# Patient Record
Sex: MALE | Race: WHITE | Employment: FULL TIME | URBAN - METROPOLITAN AREA
[De-identification: names, ages, dates, MRNs, and addresses within clinical notes are randomized per-mention and may not be internally consistent; named-entity substitution may affect disease eponyms.]

---

## 2025-04-18 ENCOUNTER — APPOINTMENT (OUTPATIENT)
Dept: GENERAL RADIOLOGY | Age: 45
DRG: 308 | End: 2025-04-18
Payer: COMMERCIAL

## 2025-04-18 ENCOUNTER — HOSPITAL ENCOUNTER (INPATIENT)
Age: 45
LOS: 7 days | Discharge: ANOTHER HOSPITAL | DRG: 308 | End: 2025-04-25
Attending: EMERGENCY MEDICINE | Admitting: INTERNAL MEDICINE
Payer: COMMERCIAL

## 2025-04-18 ENCOUNTER — APPOINTMENT (OUTPATIENT)
Dept: CT IMAGING | Age: 45
DRG: 308 | End: 2025-04-18
Payer: COMMERCIAL

## 2025-04-18 DIAGNOSIS — I48.91 NEW ONSET ATRIAL FIBRILLATION (HCC): Primary | ICD-10-CM

## 2025-04-18 DIAGNOSIS — I48.0 PAROXYSMAL ATRIAL FIBRILLATION (HCC): ICD-10-CM

## 2025-04-18 DIAGNOSIS — I50.33 ACUTE ON CHRONIC HEART FAILURE WITH PRESERVED EJECTION FRACTION (HCC): ICD-10-CM

## 2025-04-18 DIAGNOSIS — I48.91 ATRIAL FIBRILLATION WITH RVR (HCC): ICD-10-CM

## 2025-04-18 LAB
ALBUMIN SERPL-MCNC: 3.7 G/DL (ref 3.5–5.2)
ALP SERPL-CCNC: 63 U/L (ref 40–129)
ALT SERPL-CCNC: 88 U/L (ref 0–40)
ANION GAP SERPL CALCULATED.3IONS-SCNC: 15 MMOL/L (ref 7–16)
AST SERPL-CCNC: 73 U/L (ref 0–39)
B PARAP IS1001 DNA NPH QL NAA+NON-PROBE: NOT DETECTED
B PERT DNA SPEC QL NAA+PROBE: NOT DETECTED
BASOPHILS # BLD: 0.04 K/UL (ref 0–0.2)
BASOPHILS NFR BLD: 0 % (ref 0–2)
BILIRUB SERPL-MCNC: 2.3 MG/DL (ref 0–1.2)
BNP SERPL-MCNC: 1976 PG/ML (ref 0–125)
BUN SERPL-MCNC: 31 MG/DL (ref 6–20)
C PNEUM DNA NPH QL NAA+NON-PROBE: NOT DETECTED
CALCIUM SERPL-MCNC: 8.8 MG/DL (ref 8.6–10.2)
CHLORIDE SERPL-SCNC: 103 MMOL/L (ref 98–107)
CO2 SERPL-SCNC: 20 MMOL/L (ref 22–29)
CREAT SERPL-MCNC: 1.8 MG/DL (ref 0.7–1.2)
D-DIMER QUANTITATIVE: 576 NG/ML DDU (ref 0–230)
EKG ATRIAL RATE: 202 BPM
EKG Q-T INTERVAL: 248 MS
EKG QRS DURATION: 84 MS
EKG QTC CALCULATION (BAZETT): 444 MS
EKG R AXIS: 84 DEGREES
EKG T AXIS: -172 DEGREES
EKG VENTRICULAR RATE: 193 BPM
EOSINOPHIL # BLD: 0.01 K/UL (ref 0.05–0.5)
EOSINOPHILS RELATIVE PERCENT: 0 % (ref 0–6)
ERYTHROCYTE [DISTWIDTH] IN BLOOD BY AUTOMATED COUNT: 14.1 % (ref 11.5–15)
FLUAV RNA NPH QL NAA+NON-PROBE: NOT DETECTED
FLUBV RNA NPH QL NAA+NON-PROBE: NOT DETECTED
GFR, ESTIMATED: 46 ML/MIN/1.73M2
GLUCOSE SERPL-MCNC: 102 MG/DL (ref 74–99)
HADV DNA NPH QL NAA+NON-PROBE: NOT DETECTED
HCOV 229E RNA NPH QL NAA+NON-PROBE: NOT DETECTED
HCOV HKU1 RNA NPH QL NAA+NON-PROBE: NOT DETECTED
HCOV NL63 RNA NPH QL NAA+NON-PROBE: NOT DETECTED
HCOV OC43 RNA NPH QL NAA+NON-PROBE: NOT DETECTED
HCT VFR BLD AUTO: 46 % (ref 37–54)
HGB BLD-MCNC: 15.1 G/DL (ref 12.5–16.5)
HMPV RNA NPH QL NAA+NON-PROBE: NOT DETECTED
HPIV1 RNA NPH QL NAA+NON-PROBE: NOT DETECTED
HPIV2 RNA NPH QL NAA+NON-PROBE: NOT DETECTED
HPIV3 RNA NPH QL NAA+NON-PROBE: NOT DETECTED
HPIV4 RNA NPH QL NAA+NON-PROBE: NOT DETECTED
IMM GRANULOCYTES # BLD AUTO: 0.13 K/UL (ref 0–0.58)
IMM GRANULOCYTES NFR BLD: 1 % (ref 0–5)
INFLUENZA A BY PCR: NOT DETECTED
INFLUENZA B BY PCR: NOT DETECTED
LYMPHOCYTES NFR BLD: 2.28 K/UL (ref 1.5–4)
LYMPHOCYTES RELATIVE PERCENT: 17 % (ref 20–42)
M PNEUMO DNA NPH QL NAA+NON-PROBE: NOT DETECTED
MAGNESIUM SERPL-MCNC: 1.8 MG/DL (ref 1.6–2.6)
MCH RBC QN AUTO: 29.1 PG (ref 26–35)
MCHC RBC AUTO-ENTMCNC: 32.8 G/DL (ref 32–34.5)
MCV RBC AUTO: 88.6 FL (ref 80–99.9)
MONOCYTES NFR BLD: 1.48 K/UL (ref 0.1–0.95)
MONOCYTES NFR BLD: 11 % (ref 2–12)
NEUTROPHILS NFR BLD: 71 % (ref 43–80)
NEUTS SEG NFR BLD: 9.79 K/UL (ref 1.8–7.3)
PLATELET # BLD AUTO: 258 K/UL (ref 130–450)
PMV BLD AUTO: 10.4 FL (ref 7–12)
POTASSIUM SERPL-SCNC: 3.9 MMOL/L (ref 3.5–5)
PROCALCITONIN SERPL-MCNC: 0.07 NG/ML (ref 0–0.08)
PROT SERPL-MCNC: 6.2 G/DL (ref 6.4–8.3)
RBC # BLD AUTO: 5.19 M/UL (ref 3.8–5.8)
RSV RNA NPH QL NAA+NON-PROBE: NOT DETECTED
RV+EV RNA NPH QL NAA+NON-PROBE: NOT DETECTED
SARS-COV-2 RNA NPH QL NAA+NON-PROBE: NOT DETECTED
SODIUM SERPL-SCNC: 138 MMOL/L (ref 132–146)
SPECIMEN DESCRIPTION: NORMAL
T4 FREE SERPL-MCNC: 1 NG/DL (ref 0.9–1.7)
TROPONIN I SERPL HS-MCNC: 17 NG/L (ref 0–22)
TROPONIN I SERPL HS-MCNC: 24 NG/L (ref 0–22)
TSH SERPL DL<=0.05 MIU/L-ACNC: 4.38 UIU/ML (ref 0.27–4.2)
WBC OTHER # BLD: 13.7 K/UL (ref 4.5–11.5)

## 2025-04-18 PROCEDURE — 2500000003 HC RX 250 WO HCPCS

## 2025-04-18 PROCEDURE — 2580000003 HC RX 258

## 2025-04-18 PROCEDURE — 96372 THER/PROPH/DIAG INJ SC/IM: CPT

## 2025-04-18 PROCEDURE — 96375 TX/PRO/DX INJ NEW DRUG ADDON: CPT

## 2025-04-18 PROCEDURE — B24BZZ4 ULTRASONOGRAPHY OF HEART WITH AORTA, TRANSESOPHAGEAL: ICD-10-PCS | Performed by: STUDENT IN AN ORGANIZED HEALTH CARE EDUCATION/TRAINING PROGRAM

## 2025-04-18 PROCEDURE — 84439 ASSAY OF FREE THYROXINE: CPT

## 2025-04-18 PROCEDURE — 84484 ASSAY OF TROPONIN QUANT: CPT

## 2025-04-18 PROCEDURE — 84145 PROCALCITONIN (PCT): CPT

## 2025-04-18 PROCEDURE — 80053 COMPREHEN METABOLIC PANEL: CPT

## 2025-04-18 PROCEDURE — 6360000002 HC RX W HCPCS

## 2025-04-18 PROCEDURE — 2060000000 HC ICU INTERMEDIATE R&B

## 2025-04-18 PROCEDURE — 2580000003 HC RX 258: Performed by: EMERGENCY MEDICINE

## 2025-04-18 PROCEDURE — 6360000004 HC RX CONTRAST MEDICATION: Performed by: RADIOLOGY

## 2025-04-18 PROCEDURE — 96366 THER/PROPH/DIAG IV INF ADDON: CPT

## 2025-04-18 PROCEDURE — 2580000003 HC RX 258: Performed by: INTERNAL MEDICINE

## 2025-04-18 PROCEDURE — 87040 BLOOD CULTURE FOR BACTERIA: CPT

## 2025-04-18 PROCEDURE — 71275 CT ANGIOGRAPHY CHEST: CPT

## 2025-04-18 PROCEDURE — 6370000000 HC RX 637 (ALT 250 FOR IP): Performed by: INTERNAL MEDICINE

## 2025-04-18 PROCEDURE — 5A2204Z RESTORATION OF CARDIAC RHYTHM, SINGLE: ICD-10-PCS | Performed by: STUDENT IN AN ORGANIZED HEALTH CARE EDUCATION/TRAINING PROGRAM

## 2025-04-18 PROCEDURE — 0202U NFCT DS 22 TRGT SARS-COV-2: CPT

## 2025-04-18 PROCEDURE — 85379 FIBRIN DEGRADATION QUANT: CPT

## 2025-04-18 PROCEDURE — 83880 ASSAY OF NATRIURETIC PEPTIDE: CPT

## 2025-04-18 PROCEDURE — 87502 INFLUENZA DNA AMP PROBE: CPT

## 2025-04-18 PROCEDURE — 99223 1ST HOSP IP/OBS HIGH 75: CPT | Performed by: INTERNAL MEDICINE

## 2025-04-18 PROCEDURE — 96365 THER/PROPH/DIAG IV INF INIT: CPT

## 2025-04-18 PROCEDURE — 71045 X-RAY EXAM CHEST 1 VIEW: CPT

## 2025-04-18 PROCEDURE — 83735 ASSAY OF MAGNESIUM: CPT

## 2025-04-18 PROCEDURE — 93005 ELECTROCARDIOGRAM TRACING: CPT

## 2025-04-18 PROCEDURE — 99285 EMERGENCY DEPT VISIT HI MDM: CPT

## 2025-04-18 PROCEDURE — 84443 ASSAY THYROID STIM HORMONE: CPT

## 2025-04-18 PROCEDURE — 93010 ELECTROCARDIOGRAM REPORT: CPT | Performed by: INTERNAL MEDICINE

## 2025-04-18 PROCEDURE — 85025 COMPLETE CBC W/AUTO DIFF WBC: CPT

## 2025-04-18 RX ORDER — ALBUTEROL SULFATE 0.83 MG/ML
2.5 SOLUTION RESPIRATORY (INHALATION) EVERY 4 HOURS PRN
Status: DISCONTINUED | OUTPATIENT
Start: 2025-04-18 | End: 2025-04-25 | Stop reason: HOSPADM

## 2025-04-18 RX ORDER — 0.9 % SODIUM CHLORIDE 0.9 %
1000 INTRAVENOUS SOLUTION INTRAVENOUS ONCE
Status: COMPLETED | OUTPATIENT
Start: 2025-04-18 | End: 2025-04-18

## 2025-04-18 RX ORDER — GUAIFENESIN/DEXTROMETHORPHAN 100-10MG/5
5 SYRUP ORAL EVERY 4 HOURS PRN
Status: DISCONTINUED | OUTPATIENT
Start: 2025-04-18 | End: 2025-04-25 | Stop reason: HOSPADM

## 2025-04-18 RX ORDER — POLYETHYLENE GLYCOL 3350 17 G/17G
17 POWDER, FOR SOLUTION ORAL DAILY PRN
Status: DISCONTINUED | OUTPATIENT
Start: 2025-04-18 | End: 2025-04-25 | Stop reason: HOSPADM

## 2025-04-18 RX ORDER — SERTRALINE HYDROCHLORIDE 100 MG/1
100 TABLET, FILM COATED ORAL DAILY
Status: DISCONTINUED | OUTPATIENT
Start: 2025-04-18 | End: 2025-04-25 | Stop reason: HOSPADM

## 2025-04-18 RX ORDER — DOXYCYCLINE 100 MG/1
100 CAPSULE ORAL EVERY 12 HOURS SCHEDULED
Status: COMPLETED | OUTPATIENT
Start: 2025-04-18 | End: 2025-04-23

## 2025-04-18 RX ORDER — DILTIAZEM HYDROCHLORIDE 5 MG/ML
10 INJECTION INTRAVENOUS ONCE
Status: COMPLETED | OUTPATIENT
Start: 2025-04-18 | End: 2025-04-18

## 2025-04-18 RX ORDER — PREDNISONE 20 MG/1
TABLET ORAL
Status: ON HOLD | COMMUNITY
Start: 2025-04-14

## 2025-04-18 RX ORDER — LEVOTHYROXINE SODIUM 50 UG/1
50 TABLET ORAL DAILY
Status: DISCONTINUED | OUTPATIENT
Start: 2025-04-18 | End: 2025-04-25 | Stop reason: HOSPADM

## 2025-04-18 RX ORDER — POTASSIUM CHLORIDE 7.45 MG/ML
10 INJECTION INTRAVENOUS PRN
Status: DISCONTINUED | OUTPATIENT
Start: 2025-04-18 | End: 2025-04-25 | Stop reason: HOSPADM

## 2025-04-18 RX ORDER — SODIUM CHLORIDE 9 MG/ML
INJECTION, SOLUTION INTRAVENOUS PRN
Status: DISCONTINUED | OUTPATIENT
Start: 2025-04-18 | End: 2025-04-25 | Stop reason: HOSPADM

## 2025-04-18 RX ORDER — DEXTROAMPHETAMINE SACCHARATE, AMPHETAMINE ASPARTATE MONOHYDRATE, DEXTROAMPHETAMINE SULFATE AND AMPHETAMINE SULFATE 3.75; 3.75; 3.75; 3.75 MG/1; MG/1; MG/1; MG/1
15 CAPSULE, EXTENDED RELEASE ORAL EVERY MORNING
Status: ON HOLD | COMMUNITY
Start: 2025-04-01

## 2025-04-18 RX ORDER — SODIUM CHLORIDE 0.9 % (FLUSH) 0.9 %
5-40 SYRINGE (ML) INJECTION PRN
Status: DISCONTINUED | OUTPATIENT
Start: 2025-04-18 | End: 2025-04-25 | Stop reason: HOSPADM

## 2025-04-18 RX ORDER — SODIUM CHLORIDE 9 MG/ML
INJECTION, SOLUTION INTRAVENOUS CONTINUOUS
Status: DISCONTINUED | OUTPATIENT
Start: 2025-04-18 | End: 2025-04-19

## 2025-04-18 RX ORDER — IOPAMIDOL 755 MG/ML
75 INJECTION, SOLUTION INTRAVASCULAR
Status: COMPLETED | OUTPATIENT
Start: 2025-04-18 | End: 2025-04-18

## 2025-04-18 RX ORDER — ONDANSETRON 4 MG/1
4 TABLET, ORALLY DISINTEGRATING ORAL EVERY 8 HOURS PRN
Status: DISCONTINUED | OUTPATIENT
Start: 2025-04-18 | End: 2025-04-25 | Stop reason: HOSPADM

## 2025-04-18 RX ORDER — POTASSIUM CHLORIDE 1500 MG/1
40 TABLET, EXTENDED RELEASE ORAL PRN
Status: DISCONTINUED | OUTPATIENT
Start: 2025-04-18 | End: 2025-04-25 | Stop reason: HOSPADM

## 2025-04-18 RX ORDER — MAGNESIUM SULFATE IN WATER 40 MG/ML
2000 INJECTION, SOLUTION INTRAVENOUS PRN
Status: DISCONTINUED | OUTPATIENT
Start: 2025-04-18 | End: 2025-04-25 | Stop reason: HOSPADM

## 2025-04-18 RX ORDER — ACETAMINOPHEN 650 MG/1
650 SUPPOSITORY RECTAL EVERY 6 HOURS PRN
Status: DISCONTINUED | OUTPATIENT
Start: 2025-04-18 | End: 2025-04-25 | Stop reason: HOSPADM

## 2025-04-18 RX ORDER — ONDANSETRON 2 MG/ML
4 INJECTION INTRAMUSCULAR; INTRAVENOUS EVERY 6 HOURS PRN
Status: DISCONTINUED | OUTPATIENT
Start: 2025-04-18 | End: 2025-04-25 | Stop reason: HOSPADM

## 2025-04-18 RX ORDER — METHYLPREDNISOLONE SODIUM SUCCINATE 125 MG/2ML
125 INJECTION INTRAMUSCULAR; INTRAVENOUS ONCE
Status: COMPLETED | OUTPATIENT
Start: 2025-04-18 | End: 2025-04-18

## 2025-04-18 RX ORDER — DIPHENHYDRAMINE HYDROCHLORIDE 50 MG/ML
50 INJECTION, SOLUTION INTRAMUSCULAR; INTRAVENOUS ONCE
Status: COMPLETED | OUTPATIENT
Start: 2025-04-18 | End: 2025-04-18

## 2025-04-18 RX ORDER — PANTOPRAZOLE SODIUM 40 MG/1
40 TABLET, DELAYED RELEASE ORAL DAILY
Status: DISCONTINUED | OUTPATIENT
Start: 2025-04-18 | End: 2025-04-25 | Stop reason: HOSPADM

## 2025-04-18 RX ORDER — ACETAMINOPHEN 325 MG/1
650 TABLET ORAL EVERY 6 HOURS PRN
Status: DISCONTINUED | OUTPATIENT
Start: 2025-04-18 | End: 2025-04-25 | Stop reason: HOSPADM

## 2025-04-18 RX ORDER — LEVOTHYROXINE SODIUM 50 UG/1
50 TABLET ORAL DAILY
Status: ON HOLD | COMMUNITY

## 2025-04-18 RX ORDER — ENOXAPARIN SODIUM 150 MG/ML
1 INJECTION SUBCUTANEOUS ONCE
Status: COMPLETED | OUTPATIENT
Start: 2025-04-18 | End: 2025-04-18

## 2025-04-18 RX ORDER — PANTOPRAZOLE SODIUM 40 MG/1
40 TABLET, DELAYED RELEASE ORAL DAILY
Status: ON HOLD | COMMUNITY

## 2025-04-18 RX ORDER — ERGOCALCIFEROL 1.25 MG/1
50000 CAPSULE, LIQUID FILLED ORAL
Status: ON HOLD | COMMUNITY
Start: 2025-04-05

## 2025-04-18 RX ORDER — SODIUM CHLORIDE 0.9 % (FLUSH) 0.9 %
5-40 SYRINGE (ML) INJECTION EVERY 12 HOURS SCHEDULED
Status: DISCONTINUED | OUTPATIENT
Start: 2025-04-18 | End: 2025-04-25 | Stop reason: HOSPADM

## 2025-04-18 RX ADMIN — SODIUM CHLORIDE: 0.9 INJECTION, SOLUTION INTRAVENOUS at 18:54

## 2025-04-18 RX ADMIN — SODIUM CHLORIDE 1000 ML: 0.9 INJECTION, SOLUTION INTRAVENOUS at 11:08

## 2025-04-18 RX ADMIN — DILTIAZEM HYDROCHLORIDE 5 MG/HR: 5 INJECTION INTRAVENOUS at 11:26

## 2025-04-18 RX ADMIN — SERTRALINE 100 MG: 100 TABLET, FILM COATED ORAL at 18:49

## 2025-04-18 RX ADMIN — METHYLPREDNISOLONE SODIUM SUCCINATE 125 MG: 125 INJECTION INTRAMUSCULAR; INTRAVENOUS at 11:38

## 2025-04-18 RX ADMIN — DOXYCYCLINE HYCLATE 100 MG: 100 CAPSULE ORAL at 23:09

## 2025-04-18 RX ADMIN — DILTIAZEM HYDROCHLORIDE 15 MG/HR: 5 INJECTION INTRAVENOUS at 21:00

## 2025-04-18 RX ADMIN — DIPHENHYDRAMINE HYDROCHLORIDE 50 MG: 50 INJECTION INTRAMUSCULAR; INTRAVENOUS at 11:38

## 2025-04-18 RX ADMIN — DOXYCYCLINE 100 MG: 100 INJECTION, POWDER, LYOPHILIZED, FOR SOLUTION INTRAVENOUS at 14:14

## 2025-04-18 RX ADMIN — PANTOPRAZOLE SODIUM 40 MG: 40 TABLET, DELAYED RELEASE ORAL at 18:49

## 2025-04-18 RX ADMIN — DILTIAZEM HYDROCHLORIDE 10 MG: 5 INJECTION, SOLUTION INTRAVENOUS at 11:07

## 2025-04-18 RX ADMIN — ENOXAPARIN SODIUM 135 MG: 150 INJECTION SUBCUTANEOUS at 13:50

## 2025-04-18 RX ADMIN — LEVOTHYROXINE SODIUM 50 MCG: 0.05 TABLET ORAL at 18:49

## 2025-04-18 RX ADMIN — WATER 2000 MG: 1 INJECTION INTRAMUSCULAR; INTRAVENOUS; SUBCUTANEOUS at 13:51

## 2025-04-18 RX ADMIN — IOPAMIDOL 75 ML: 755 INJECTION, SOLUTION INTRAVENOUS at 12:36

## 2025-04-18 ASSESSMENT — LIFESTYLE VARIABLES
HOW OFTEN DO YOU HAVE A DRINK CONTAINING ALCOHOL: NEVER
HOW MANY STANDARD DRINKS CONTAINING ALCOHOL DO YOU HAVE ON A TYPICAL DAY: PATIENT DOES NOT DRINK

## 2025-04-18 ASSESSMENT — PAIN - FUNCTIONAL ASSESSMENT: PAIN_FUNCTIONAL_ASSESSMENT: NONE - DENIES PAIN

## 2025-04-18 NOTE — ED PROVIDER NOTES
Department of Emergency Medicine     Written by: Chaparro Bui DO  Patient Name: Solomon Adams  Admit Date: 2025  9:55 AM  MRN: [id number]                   : 1980      CHIEF COMPLAINT     Chief Complaint   Patient presents with    Shortness of Breath     Ongoing for a couple weeks, seen at PCP on Monday started on steroid and amoxicillin and feels like he is getting worse      HPI     Solomon Adams is a 44 y.o. male who presents to the emergency department today complaining of shortness of breath.  Patient states for about 4 weeks, he has been having cough productive of yellow sputum and nausea with shortness of breath.  Patient was seen by his PCP 4 days ago and started on a steroid as well as amoxicillin.  Patient feels he is not getting any better and he is getting significantly short of breath specially with position changes and exertion.  He states that he has no appetite and is nauseous and is having to take the antibiotic by eating a spoonful of yogurt because of how little appetite he has.  Of note, patient is on testosterone supplementation.  Patient denies any lightheadedness or dizziness, fever or chills, vomiting, chest pain, abdominal pain, hematuria or dysuria, constipation or diarrhea.    Nursing notes were all reviewed and agreed with or any disagreements were addressed in the HPI.    REVIEW OF SYSTEMS:    Pertinent positives and negatives mentioned in the HPI/MDM.     REVIEW OF OUTSIDE RECORDS: Chart reviewed from 2024 office visit with primary care patient was seen for chronic issues.    SOCIAL DETERMINANTS OF HEALTH: Patient has good medical compliance and fluency as well as established follow-up with family physician.    PAST HISTORY     I personally reviewed the following history:    Past Medical History:  has no past medical history on file.    Past Surgical History:  has no past surgical history on file.    Social History:      Family History: family history is not on file.

## 2025-04-18 NOTE — H&P
[facility] Hospitalist Group History and Physical      Perpetual assessment: 44-year-old male with past medical history of hypothyroidism ADHD and GERD presents with shortness of breath and palpitations.    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 also possibly  - No previous history of heart disease or family history  - Magnesium and potassium within normal limits  - Will continue Cardizem infusion  - Did receive a dose of Lovenox  - Consult cardiology for further evaluation  - Check echocardiogram  - Defer further anticoagulation to them    Community-acquired pneumonia  - Again could be causing the above atrial fibrillation  - Will place on doxycycline and Rocephin  - Chest x-ray did show some basilar infiltrate  - White count slightly elevated 13,700 which could be an infection versus steroid use  - Will check procalcitonin  - Nebs as needed    Acute kidney injury  - Suspect secondary from pneumonia above A-fib RVR  - Will hold any nephrotoxic drugs  - Continue fluid hydration  - Repeat BMP in a.m.    Hypothyroidism  - TSH is slightly bit elevated which could be false during the acute phase  - Will need repeat TSH a few weeks after discharge  - Continue current Synthroid dose    ADHD  - Hold patient's Adderall for now    GERD  - No acute reflux symptomatic  - Continue patient's Protonix    Major depression partial remission  - No acute suicide ideation  - Continue Zoloft    Morbid obesity  - BMI is 41.37  - Encourage diet and exercise program    Code Status: Full  DVT prophylaxis: Lovenox      CHIEF COMPLAINT: Shortness of breath    History of Present Illness: This is a 44-year-old male who presents to [facility] with the above-stated complaint.  All history has been obtained from the patient and review of medical records.

## 2025-04-18 NOTE — ED NOTES
ED to Inpatient Handoff Report    Notified Kelly that electronic handoff available and patient ready for transport to room 448A.    Safety Risks: None identified    Patient in Restraints: no    Constant Observer or Patient : no    Telemetry Monitoring Ordered :Yes      Cardiac Rhythm: A fib RVR    Order to transfer to unit without monitor:NO - transported to the floor by RN on portable telemetry        Deterioration Index Score:   Predictive Model Details          22 (Normal)  Factor Value    Calculated 4/18/2025 16:39 37% Age 44 years old    Deterioration Index Model 21% Cardiac rhythm A fib RVR     16% WBC count abnormal (13.7 k/uL)     9% Systolic 142     7% Pulse 96     5% Pulse oximetry 93 %     5% BUN abnormal (31 mg/dL)     1% Sodium 138 mmol/L     0% Hematocrit 46.0 %     0% Temperature 98 °F (36.7 °C)     0% Respiratory rate 16     0% Potassium 3.9 mmol/L        Vitals:    04/18/25 1545 04/18/25 1600 04/18/25 1615 04/18/25 1630   BP:    (!) 142/80   Pulse: (!) 106 (!) 106 (!) 116 96   Resp: 21 (!) 9 19 16   Temp:    98 °F (36.7 °C)   TempSrc:    Oral   SpO2: 96% 94% 93% 93%   Weight:       Height:             Opportunity for questions and clarification was provided during telephone report.

## 2025-04-19 PROBLEM — N17.9 ACUTE RENAL FAILURE: Status: ACTIVE | Noted: 2025-04-19

## 2025-04-19 PROBLEM — I50.33 ACUTE ON CHRONIC HEART FAILURE WITH PRESERVED EJECTION FRACTION (HCC): Status: ACTIVE | Noted: 2025-04-19

## 2025-04-19 PROBLEM — E66.01 MORBID OBESITY (HCC): Status: ACTIVE | Noted: 2025-04-19

## 2025-04-19 LAB
ANION GAP SERPL CALCULATED.3IONS-SCNC: 14 MMOL/L (ref 7–16)
BASOPHILS # BLD: 0.01 K/UL (ref 0–0.2)
BASOPHILS NFR BLD: 0 % (ref 0–2)
BUN SERPL-MCNC: 23 MG/DL (ref 6–20)
CALCIUM SERPL-MCNC: 8.4 MG/DL (ref 8.6–10.2)
CHLORIDE SERPL-SCNC: 107 MMOL/L (ref 98–107)
CHOLEST SERPL-MCNC: 148 MG/DL
CO2 SERPL-SCNC: 18 MMOL/L (ref 22–29)
CREAT SERPL-MCNC: 1.5 MG/DL (ref 0.7–1.2)
EOSINOPHIL # BLD: 0 K/UL (ref 0.05–0.5)
EOSINOPHILS RELATIVE PERCENT: 0 % (ref 0–6)
ERYTHROCYTE [DISTWIDTH] IN BLOOD BY AUTOMATED COUNT: 14.6 % (ref 11.5–15)
GFR, ESTIMATED: 61 ML/MIN/1.73M2
GLUCOSE SERPL-MCNC: 120 MG/DL (ref 74–99)
HCT VFR BLD AUTO: 44.9 % (ref 37–54)
HDLC SERPL-MCNC: 28 MG/DL
HGB BLD-MCNC: 14.7 G/DL (ref 12.5–16.5)
IMM GRANULOCYTES # BLD AUTO: 0.06 K/UL (ref 0–0.58)
IMM GRANULOCYTES NFR BLD: 1 % (ref 0–5)
LDLC SERPL CALC-MCNC: 101 MG/DL
LYMPHOCYTES NFR BLD: 1.05 K/UL (ref 1.5–4)
LYMPHOCYTES RELATIVE PERCENT: 11 % (ref 20–42)
MAGNESIUM SERPL-MCNC: 2.1 MG/DL (ref 1.6–2.6)
MCH RBC QN AUTO: 29.5 PG (ref 26–35)
MCHC RBC AUTO-ENTMCNC: 32.7 G/DL (ref 32–34.5)
MCV RBC AUTO: 90 FL (ref 80–99.9)
MONOCYTES NFR BLD: 0.33 K/UL (ref 0.1–0.95)
MONOCYTES NFR BLD: 4 % (ref 2–12)
NEUTROPHILS NFR BLD: 84 % (ref 43–80)
NEUTS SEG NFR BLD: 7.79 K/UL (ref 1.8–7.3)
PLATELET # BLD AUTO: 219 K/UL (ref 130–450)
PMV BLD AUTO: 10.6 FL (ref 7–12)
POTASSIUM SERPL-SCNC: 4.6 MMOL/L (ref 3.5–5)
RBC # BLD AUTO: 4.99 M/UL (ref 3.8–5.8)
SODIUM SERPL-SCNC: 139 MMOL/L (ref 132–146)
TRIGL SERPL-MCNC: 94 MG/DL
VLDLC SERPL CALC-MCNC: 19 MG/DL
WBC OTHER # BLD: 9.2 K/UL (ref 4.5–11.5)

## 2025-04-19 PROCEDURE — 80061 LIPID PANEL: CPT

## 2025-04-19 PROCEDURE — 85025 COMPLETE CBC W/AUTO DIFF WBC: CPT

## 2025-04-19 PROCEDURE — 2500000003 HC RX 250 WO HCPCS: Performed by: INTERNAL MEDICINE

## 2025-04-19 PROCEDURE — 83735 ASSAY OF MAGNESIUM: CPT

## 2025-04-19 PROCEDURE — 2580000003 HC RX 258

## 2025-04-19 PROCEDURE — 80307 DRUG TEST PRSMV CHEM ANLYZR: CPT

## 2025-04-19 PROCEDURE — 2060000000 HC ICU INTERMEDIATE R&B

## 2025-04-19 PROCEDURE — 2580000003 HC RX 258: Performed by: INTERNAL MEDICINE

## 2025-04-19 PROCEDURE — 6370000000 HC RX 637 (ALT 250 FOR IP): Performed by: INTERNAL MEDICINE

## 2025-04-19 PROCEDURE — 2500000003 HC RX 250 WO HCPCS

## 2025-04-19 PROCEDURE — 99233 SBSQ HOSP IP/OBS HIGH 50: CPT | Performed by: STUDENT IN AN ORGANIZED HEALTH CARE EDUCATION/TRAINING PROGRAM

## 2025-04-19 PROCEDURE — 99223 1ST HOSP IP/OBS HIGH 75: CPT | Performed by: INTERNAL MEDICINE

## 2025-04-19 PROCEDURE — 6360000002 HC RX W HCPCS: Performed by: INTERNAL MEDICINE

## 2025-04-19 PROCEDURE — 6370000000 HC RX 637 (ALT 250 FOR IP): Performed by: NURSE PRACTITIONER

## 2025-04-19 PROCEDURE — 80048 BASIC METABOLIC PNL TOTAL CA: CPT

## 2025-04-19 RX ORDER — METOPROLOL TARTRATE 50 MG
50 TABLET ORAL 2 TIMES DAILY
Status: DISCONTINUED | OUTPATIENT
Start: 2025-04-19 | End: 2025-04-21

## 2025-04-19 RX ORDER — METOPROLOL TARTRATE 25 MG/1
25 TABLET, FILM COATED ORAL 2 TIMES DAILY
Status: DISCONTINUED | OUTPATIENT
Start: 2025-04-19 | End: 2025-04-19

## 2025-04-19 RX ADMIN — ONDANSETRON 4 MG: 4 TABLET, ORALLY DISINTEGRATING ORAL at 22:09

## 2025-04-19 RX ADMIN — METOPROLOL TARTRATE 50 MG: 50 TABLET, FILM COATED ORAL at 20:08

## 2025-04-19 RX ADMIN — DOXYCYCLINE HYCLATE 100 MG: 100 CAPSULE ORAL at 20:08

## 2025-04-19 RX ADMIN — APIXABAN 5 MG: 5 TABLET, FILM COATED ORAL at 20:08

## 2025-04-19 RX ADMIN — LEVOTHYROXINE SODIUM 50 MCG: 0.05 TABLET ORAL at 05:31

## 2025-04-19 RX ADMIN — DOXYCYCLINE HYCLATE 100 MG: 100 CAPSULE ORAL at 08:40

## 2025-04-19 RX ADMIN — SODIUM CHLORIDE, PRESERVATIVE FREE 10 ML: 5 INJECTION INTRAVENOUS at 08:41

## 2025-04-19 RX ADMIN — SODIUM CHLORIDE: 0.9 INJECTION, SOLUTION INTRAVENOUS at 07:22

## 2025-04-19 RX ADMIN — METOPROLOL TARTRATE 25 MG: 25 TABLET, FILM COATED ORAL at 08:40

## 2025-04-19 RX ADMIN — SERTRALINE 100 MG: 100 TABLET, FILM COATED ORAL at 08:40

## 2025-04-19 RX ADMIN — DILTIAZEM HYDROCHLORIDE 12.5 MG/HR: 5 INJECTION INTRAVENOUS at 05:28

## 2025-04-19 RX ADMIN — PANTOPRAZOLE SODIUM 40 MG: 40 TABLET, DELAYED RELEASE ORAL at 08:40

## 2025-04-19 RX ADMIN — APIXABAN 5 MG: 5 TABLET, FILM COATED ORAL at 13:18

## 2025-04-19 RX ADMIN — WATER 1000 MG: 1 INJECTION INTRAMUSCULAR; INTRAVENOUS; SUBCUTANEOUS at 13:18

## 2025-04-19 NOTE — PLAN OF CARE
Problem: ABCDS Injury Assessment  Goal: Absence of physical injury  4/19/2025 1646 by Aruna Lozano RN  Outcome: Progressing     Problem: Discharge Planning  Goal: Discharge to home or other facility with appropriate resources  4/19/2025 1646 by Aruna Lozano RN  Outcome: Progressing     Problem: Safety - Adult  Goal: Free from fall injury  4/19/2025 1646 by Aruna Lozano RN  Outcome: Progressing

## 2025-04-20 ENCOUNTER — APPOINTMENT (OUTPATIENT)
Age: 45
DRG: 308 | End: 2025-04-20
Payer: COMMERCIAL

## 2025-04-20 LAB
ANION GAP SERPL CALCULATED.3IONS-SCNC: 11 MMOL/L (ref 7–16)
BASOPHILS # BLD: 0.02 K/UL (ref 0–0.2)
BASOPHILS NFR BLD: 0 % (ref 0–2)
BUN SERPL-MCNC: 25 MG/DL (ref 6–20)
CALCIUM SERPL-MCNC: 8.3 MG/DL (ref 8.6–10.2)
CHLORIDE SERPL-SCNC: 107 MMOL/L (ref 98–107)
CO2 SERPL-SCNC: 21 MMOL/L (ref 22–29)
CREAT SERPL-MCNC: 1.4 MG/DL (ref 0.7–1.2)
EOSINOPHIL # BLD: 0.01 K/UL (ref 0.05–0.5)
EOSINOPHILS RELATIVE PERCENT: 0 % (ref 0–6)
ERYTHROCYTE [DISTWIDTH] IN BLOOD BY AUTOMATED COUNT: 14.8 % (ref 11.5–15)
GFR, ESTIMATED: 64 ML/MIN/1.73M2
GLUCOSE SERPL-MCNC: 105 MG/DL (ref 74–99)
HCT VFR BLD AUTO: 47.1 % (ref 37–54)
HGB BLD-MCNC: 15 G/DL (ref 12.5–16.5)
IMM GRANULOCYTES # BLD AUTO: 0.14 K/UL (ref 0–0.58)
IMM GRANULOCYTES NFR BLD: 1 % (ref 0–5)
LYMPHOCYTES NFR BLD: 1.78 K/UL (ref 1.5–4)
LYMPHOCYTES RELATIVE PERCENT: 13 % (ref 20–42)
MCH RBC QN AUTO: 29.1 PG (ref 26–35)
MCHC RBC AUTO-ENTMCNC: 31.8 G/DL (ref 32–34.5)
MCV RBC AUTO: 91.3 FL (ref 80–99.9)
MONOCYTES NFR BLD: 1.27 K/UL (ref 0.1–0.95)
MONOCYTES NFR BLD: 10 % (ref 2–12)
NEUTROPHILS NFR BLD: 76 % (ref 43–80)
NEUTS SEG NFR BLD: 10.16 K/UL (ref 1.8–7.3)
PLATELET # BLD AUTO: 237 K/UL (ref 130–450)
PMV BLD AUTO: 10.9 FL (ref 7–12)
POTASSIUM SERPL-SCNC: 4.4 MMOL/L (ref 3.5–5)
RBC # BLD AUTO: 5.16 M/UL (ref 3.8–5.8)
SODIUM SERPL-SCNC: 139 MMOL/L (ref 132–146)
WBC OTHER # BLD: 13.4 K/UL (ref 4.5–11.5)

## 2025-04-20 PROCEDURE — 6370000000 HC RX 637 (ALT 250 FOR IP): Performed by: INTERNAL MEDICINE

## 2025-04-20 PROCEDURE — 6370000000 HC RX 637 (ALT 250 FOR IP): Performed by: NURSE PRACTITIONER

## 2025-04-20 PROCEDURE — 2060000000 HC ICU INTERMEDIATE R&B

## 2025-04-20 PROCEDURE — 85025 COMPLETE CBC W/AUTO DIFF WBC: CPT

## 2025-04-20 PROCEDURE — 36415 COLL VENOUS BLD VENIPUNCTURE: CPT

## 2025-04-20 PROCEDURE — 80048 BASIC METABOLIC PNL TOTAL CA: CPT

## 2025-04-20 PROCEDURE — 6360000002 HC RX W HCPCS: Performed by: INTERNAL MEDICINE

## 2025-04-20 PROCEDURE — 2500000003 HC RX 250 WO HCPCS: Performed by: INTERNAL MEDICINE

## 2025-04-20 PROCEDURE — 99233 SBSQ HOSP IP/OBS HIGH 50: CPT | Performed by: STUDENT IN AN ORGANIZED HEALTH CARE EDUCATION/TRAINING PROGRAM

## 2025-04-20 PROCEDURE — 99233 SBSQ HOSP IP/OBS HIGH 50: CPT | Performed by: INTERNAL MEDICINE

## 2025-04-20 RX ORDER — DILTIAZEM HYDROCHLORIDE 120 MG/1
120 CAPSULE, COATED, EXTENDED RELEASE ORAL DAILY
Status: DISCONTINUED | OUTPATIENT
Start: 2025-04-20 | End: 2025-04-21

## 2025-04-20 RX ORDER — DIPHENHYDRAMINE HYDROCHLORIDE 50 MG/ML
25 INJECTION, SOLUTION INTRAMUSCULAR; INTRAVENOUS NIGHTLY PRN
Status: DISCONTINUED | OUTPATIENT
Start: 2025-04-20 | End: 2025-04-25 | Stop reason: HOSPADM

## 2025-04-20 RX ADMIN — LEVOTHYROXINE SODIUM 50 MCG: 0.05 TABLET ORAL at 06:57

## 2025-04-20 RX ADMIN — DOXYCYCLINE HYCLATE 100 MG: 100 CAPSULE ORAL at 20:03

## 2025-04-20 RX ADMIN — WATER 1000 MG: 1 INJECTION INTRAMUSCULAR; INTRAVENOUS; SUBCUTANEOUS at 15:44

## 2025-04-20 RX ADMIN — PANTOPRAZOLE SODIUM 40 MG: 40 TABLET, DELAYED RELEASE ORAL at 08:44

## 2025-04-20 RX ADMIN — SERTRALINE 100 MG: 100 TABLET, FILM COATED ORAL at 08:44

## 2025-04-20 RX ADMIN — APIXABAN 5 MG: 5 TABLET, FILM COATED ORAL at 20:03

## 2025-04-20 RX ADMIN — DILTIAZEM HYDROCHLORIDE 120 MG: 120 CAPSULE, COATED, EXTENDED RELEASE ORAL at 11:25

## 2025-04-20 RX ADMIN — SODIUM CHLORIDE, PRESERVATIVE FREE 10 ML: 5 INJECTION INTRAVENOUS at 20:04

## 2025-04-20 RX ADMIN — DOXYCYCLINE HYCLATE 100 MG: 100 CAPSULE ORAL at 08:44

## 2025-04-20 RX ADMIN — METOPROLOL TARTRATE 50 MG: 50 TABLET, FILM COATED ORAL at 20:03

## 2025-04-20 RX ADMIN — METOPROLOL TARTRATE 50 MG: 50 TABLET, FILM COATED ORAL at 08:44

## 2025-04-20 RX ADMIN — APIXABAN 5 MG: 5 TABLET, FILM COATED ORAL at 08:44

## 2025-04-21 ENCOUNTER — ANESTHESIA (OUTPATIENT)
Age: 45
DRG: 308 | End: 2025-04-21
Payer: COMMERCIAL

## 2025-04-21 ENCOUNTER — ANESTHESIA EVENT (OUTPATIENT)
Age: 45
DRG: 308 | End: 2025-04-21
Payer: COMMERCIAL

## 2025-04-21 ENCOUNTER — HOSPITAL ENCOUNTER (INPATIENT)
Age: 45
Discharge: HOME | DRG: 308 | End: 2025-04-23
Payer: COMMERCIAL

## 2025-04-21 VITALS
RESPIRATION RATE: 20 BRPM | SYSTOLIC BLOOD PRESSURE: 98 MMHG | DIASTOLIC BLOOD PRESSURE: 69 MMHG | HEART RATE: 88 BPM | TEMPERATURE: 97 F | OXYGEN SATURATION: 95 %

## 2025-04-21 PROBLEM — I48.0 PAROXYSMAL ATRIAL FIBRILLATION (HCC): Status: ACTIVE | Noted: 2025-04-18

## 2025-04-21 LAB
AMPHET UR QL SCN: NEGATIVE
ANION GAP SERPL CALCULATED.3IONS-SCNC: 12 MMOL/L (ref 7–16)
BARBITURATES UR QL SCN: NEGATIVE
BASOPHILS # BLD: 0.02 K/UL (ref 0–0.2)
BASOPHILS NFR BLD: 0 % (ref 0–2)
BENZODIAZ UR QL: NEGATIVE
BUN SERPL-MCNC: 21 MG/DL (ref 6–20)
BUPRENORPHINE UR QL: NEGATIVE
CALCIUM SERPL-MCNC: 8.5 MG/DL (ref 8.6–10.2)
CANNABINOIDS UR QL SCN: NEGATIVE
CHLORIDE SERPL-SCNC: 106 MMOL/L (ref 98–107)
CO2 SERPL-SCNC: 20 MMOL/L (ref 22–29)
COCAINE UR QL SCN: NEGATIVE
CREAT SERPL-MCNC: 1.5 MG/DL (ref 0.7–1.2)
ECHO BSA: 2.65 M2
ECHO LV EF PHYSICIAN: 25 %
EOSINOPHIL # BLD: 0.03 K/UL (ref 0.05–0.5)
EOSINOPHILS RELATIVE PERCENT: 0 % (ref 0–6)
ERYTHROCYTE [DISTWIDTH] IN BLOOD BY AUTOMATED COUNT: 14.7 % (ref 11.5–15)
FENTANYL UR QL: NEGATIVE
GFR, ESTIMATED: 60 ML/MIN/1.73M2
GLUCOSE SERPL-MCNC: 87 MG/DL (ref 74–99)
HCT VFR BLD AUTO: 49 % (ref 37–54)
HGB BLD-MCNC: 15.6 G/DL (ref 12.5–16.5)
IMM GRANULOCYTES # BLD AUTO: 0.05 K/UL (ref 0–0.58)
IMM GRANULOCYTES NFR BLD: 0 % (ref 0–5)
LYMPHOCYTES NFR BLD: 2.28 K/UL (ref 1.5–4)
LYMPHOCYTES RELATIVE PERCENT: 20 % (ref 20–42)
MCH RBC QN AUTO: 28.8 PG (ref 26–35)
MCHC RBC AUTO-ENTMCNC: 31.8 G/DL (ref 32–34.5)
MCV RBC AUTO: 90.6 FL (ref 80–99.9)
METHADONE UR QL: NEGATIVE
MONOCYTES NFR BLD: 0.88 K/UL (ref 0.1–0.95)
MONOCYTES NFR BLD: 8 % (ref 2–12)
NEUTROPHILS NFR BLD: 72 % (ref 43–80)
NEUTS SEG NFR BLD: 8.43 K/UL (ref 1.8–7.3)
OPIATES UR QL SCN: NEGATIVE
OXYCODONE UR QL SCN: NEGATIVE
PCP UR QL SCN: NEGATIVE
PLATELET # BLD AUTO: 235 K/UL (ref 130–450)
PMV BLD AUTO: 10.7 FL (ref 7–12)
POTASSIUM SERPL-SCNC: 4.5 MMOL/L (ref 3.5–5)
RBC # BLD AUTO: 5.41 M/UL (ref 3.8–5.8)
SODIUM SERPL-SCNC: 138 MMOL/L (ref 132–146)
TEST INFORMATION: NORMAL
WBC OTHER # BLD: 11.7 K/UL (ref 4.5–11.5)

## 2025-04-21 PROCEDURE — 93312 ECHO TRANSESOPHAGEAL: CPT

## 2025-04-21 PROCEDURE — 2580000003 HC RX 258: Performed by: NURSE ANESTHETIST, CERTIFIED REGISTERED

## 2025-04-21 PROCEDURE — 6370000000 HC RX 637 (ALT 250 FOR IP): Performed by: INTERNAL MEDICINE

## 2025-04-21 PROCEDURE — 2500000003 HC RX 250 WO HCPCS: Performed by: INTERNAL MEDICINE

## 2025-04-21 PROCEDURE — 99232 SBSQ HOSP IP/OBS MODERATE 35: CPT | Performed by: INTERNAL MEDICINE

## 2025-04-21 PROCEDURE — 3700000000 HC ANESTHESIA ATTENDED CARE

## 2025-04-21 PROCEDURE — 93320 DOPPLER ECHO COMPLETE: CPT | Performed by: INTERNAL MEDICINE

## 2025-04-21 PROCEDURE — 93312 ECHO TRANSESOPHAGEAL: CPT | Performed by: INTERNAL MEDICINE

## 2025-04-21 PROCEDURE — 93325 DOPPLER ECHO COLOR FLOW MAPG: CPT | Performed by: INTERNAL MEDICINE

## 2025-04-21 PROCEDURE — 36415 COLL VENOUS BLD VENIPUNCTURE: CPT

## 2025-04-21 PROCEDURE — 6370000000 HC RX 637 (ALT 250 FOR IP): Performed by: NURSE PRACTITIONER

## 2025-04-21 PROCEDURE — 6360000002 HC RX W HCPCS: Performed by: NURSE ANESTHETIST, CERTIFIED REGISTERED

## 2025-04-21 PROCEDURE — 6360000002 HC RX W HCPCS: Performed by: STUDENT IN AN ORGANIZED HEALTH CARE EDUCATION/TRAINING PROGRAM

## 2025-04-21 PROCEDURE — 7100000011 HC PHASE II RECOVERY - ADDTL 15 MIN

## 2025-04-21 PROCEDURE — 6360000002 HC RX W HCPCS

## 2025-04-21 PROCEDURE — 85025 COMPLETE CBC W/AUTO DIFF WBC: CPT

## 2025-04-21 PROCEDURE — 80048 BASIC METABOLIC PNL TOTAL CA: CPT

## 2025-04-21 PROCEDURE — 7100000010 HC PHASE II RECOVERY - FIRST 15 MIN

## 2025-04-21 PROCEDURE — 6360000002 HC RX W HCPCS: Performed by: INTERNAL MEDICINE

## 2025-04-21 PROCEDURE — 3700000001 HC ADD 15 MINUTES (ANESTHESIA)

## 2025-04-21 PROCEDURE — 2060000000 HC ICU INTERMEDIATE R&B

## 2025-04-21 PROCEDURE — 92960 CARDIOVERSION ELECTRIC EXT: CPT

## 2025-04-21 RX ORDER — SODIUM CHLORIDE 9 MG/ML
INJECTION, SOLUTION INTRAVENOUS
Status: DISCONTINUED | OUTPATIENT
Start: 2025-04-21 | End: 2025-04-21 | Stop reason: SDUPTHER

## 2025-04-21 RX ORDER — MIDAZOLAM HYDROCHLORIDE 1 MG/ML
INJECTION, SOLUTION INTRAMUSCULAR; INTRAVENOUS
Status: COMPLETED
Start: 2025-04-21 | End: 2025-04-21

## 2025-04-21 RX ORDER — METOPROLOL TARTRATE 1 MG/ML
5 INJECTION, SOLUTION INTRAVENOUS ONCE
Status: COMPLETED | OUTPATIENT
Start: 2025-04-21 | End: 2025-04-21

## 2025-04-21 RX ORDER — METOPROLOL SUCCINATE 50 MG/1
50 TABLET, EXTENDED RELEASE ORAL DAILY
Status: DISCONTINUED | OUTPATIENT
Start: 2025-04-22 | End: 2025-04-22

## 2025-04-21 RX ORDER — PROPOFOL 10 MG/ML
INJECTION, EMULSION INTRAVENOUS
Status: DISCONTINUED | OUTPATIENT
Start: 2025-04-21 | End: 2025-04-21 | Stop reason: SDUPTHER

## 2025-04-21 RX ORDER — MIDAZOLAM HYDROCHLORIDE 1 MG/ML
INJECTION, SOLUTION INTRAMUSCULAR; INTRAVENOUS
Status: DISCONTINUED | OUTPATIENT
Start: 2025-04-21 | End: 2025-04-21 | Stop reason: SDUPTHER

## 2025-04-21 RX ADMIN — APIXABAN 5 MG: 5 TABLET, FILM COATED ORAL at 20:45

## 2025-04-21 RX ADMIN — APIXABAN 5 MG: 5 TABLET, FILM COATED ORAL at 10:32

## 2025-04-21 RX ADMIN — WATER 1000 MG: 1 INJECTION INTRAMUSCULAR; INTRAVENOUS; SUBCUTANEOUS at 17:31

## 2025-04-21 RX ADMIN — DILTIAZEM HYDROCHLORIDE 120 MG: 120 CAPSULE, COATED, EXTENDED RELEASE ORAL at 10:32

## 2025-04-21 RX ADMIN — SODIUM CHLORIDE: 9 INJECTION, SOLUTION INTRAVENOUS at 13:15

## 2025-04-21 RX ADMIN — PROPOFOL 150 MG: 10 INJECTION, EMULSION INTRAVENOUS at 13:50

## 2025-04-21 RX ADMIN — SACUBITRIL AND VALSARTAN 1 TABLET: 24; 26 TABLET, FILM COATED ORAL at 20:45

## 2025-04-21 RX ADMIN — PANTOPRAZOLE SODIUM 40 MG: 40 TABLET, DELAYED RELEASE ORAL at 10:32

## 2025-04-21 RX ADMIN — MIDAZOLAM 2 MG: 1 INJECTION INTRAMUSCULAR; INTRAVENOUS at 13:46

## 2025-04-21 RX ADMIN — DOXYCYCLINE HYCLATE 100 MG: 100 CAPSULE ORAL at 20:45

## 2025-04-21 RX ADMIN — SODIUM CHLORIDE, PRESERVATIVE FREE 10 ML: 5 INJECTION INTRAVENOUS at 11:09

## 2025-04-21 RX ADMIN — DIPHENHYDRAMINE HYDROCHLORIDE 25 MG: 50 INJECTION INTRAMUSCULAR; INTRAVENOUS at 20:45

## 2025-04-21 RX ADMIN — SODIUM CHLORIDE, PRESERVATIVE FREE 10 ML: 5 INJECTION INTRAVENOUS at 20:45

## 2025-04-21 RX ADMIN — ONDANSETRON 4 MG: 4 TABLET, ORALLY DISINTEGRATING ORAL at 11:09

## 2025-04-21 RX ADMIN — SERTRALINE 100 MG: 100 TABLET, FILM COATED ORAL at 10:32

## 2025-04-21 RX ADMIN — METOPROLOL TARTRATE 50 MG: 50 TABLET, FILM COATED ORAL at 10:31

## 2025-04-21 RX ADMIN — METOPROLOL TARTRATE 5 MG: 5 INJECTION, SOLUTION INTRAVENOUS at 13:26

## 2025-04-21 RX ADMIN — DIPHENHYDRAMINE HYDROCHLORIDE 25 MG: 50 INJECTION INTRAMUSCULAR; INTRAVENOUS at 00:06

## 2025-04-21 RX ADMIN — DOXYCYCLINE HYCLATE 100 MG: 100 CAPSULE ORAL at 10:32

## 2025-04-21 RX ADMIN — LEVOTHYROXINE SODIUM 50 MCG: 0.05 TABLET ORAL at 06:11

## 2025-04-21 ASSESSMENT — ENCOUNTER SYMPTOMS: SHORTNESS OF BREATH: 1

## 2025-04-21 ASSESSMENT — PAIN SCALES - GENERAL: PAINLEVEL_OUTOF10: 0

## 2025-04-21 NOTE — PROGRESS NOTES
Nursing Transfer Note    Data:  Summary of patients progress: s/p BELKIS w/ DCCV  Reason for transfer: inpatient hospital stay    Action:  Explained reason for transfer to Patient.  Report given to: 4s RN, using RN Handoff Navigator.  Mode of transportation: cart    Response:  RN Recommendations: see notes/orders/MAR

## 2025-04-21 NOTE — PLAN OF CARE
Problem: ABCDS Injury Assessment  Goal: Absence of physical injury  4/21/2025 0401 by Radha Cruz RN  Outcome: Progressing     Problem: Discharge Planning  Goal: Discharge to home or other facility with appropriate resources  4/21/2025 0401 by Radha Cruz RN  Outcome: Progressing     Problem: Safety - Adult  Goal: Free from fall injury  4/21/2025 0401 by Radha Cruz RN  Outcome: Progressing     Problem: Chronic Conditions and Co-morbidities  Goal: Patient's chronic conditions and co-morbidity symptoms are monitored and maintained or improved  4/21/2025 0401 by Radha Cruz RN  Outcome: Progressing

## 2025-04-21 NOTE — ANESTHESIA POSTPROCEDURE EVALUATION
Department of Anesthesiology  Postprocedure Note    Patient: Solomon Adams  MRN: [id number]  YOB: 1980  Date of evaluation: 4/21/2025    Procedure Summary       Date: 04/21/25 Room / Location: [facility] Cardiology    Anesthesia Start: 1320 Anesthesia Stop: 1404    Procedure: BELKIS W/ POSSIBLE CARDIOVERSION (PRN CONTRAST/BUBBLE/3D) Diagnosis: Paroxysmal atrial fibrillation (HCC)    Scheduled Providers:  Responsible Provider: Severo Ford DO    Anesthesia Type: MAC ASA Status: 3            Anesthesia Type: No value filed.    Mu Phase I:      Mu Phase II:      Anesthesia Post Evaluation    Patient location during evaluation: PACU  Patient participation: complete - patient participated  Level of consciousness: awake and alert  Pain score: 0  Airway patency: patent  Nausea & Vomiting: no nausea and no vomiting  Cardiovascular status: blood pressure returned to baseline  Respiratory status: acceptable  Hydration status: euvolemic  Pain management: adequate and satisfactory to patient        No notable events documented.

## 2025-04-21 NOTE — CARE COORDINATION
CARE MANAGEMENT....Patient s/p successful BELKIS/CV this am. Met with Solomon and his wife at the bedside. He was started on entreso and eliquis- free 30 day and $10/month discount cards provided. Order for Life vest noted. Kezia Bañuelos from [facility] aware. Order for CHF nurse consult obtained. On iv rocephin and po doxy. Tolerating room air. Solomon is independent at home with his wife. He is in the process of finding a new pcp. Is not interested in assistance at this time. Will cont to follow along and assist with needs accordingly.

## 2025-04-21 NOTE — ANESTHESIA PRE PROCEDURE
Department of Anesthesiology  Preprocedure Note       Name:  Solomon Adams   Age:  44 y.o.  :  1980                                          MRN:  [id number]         Date:  2025      Surgeon: * No surgeons listed *    Procedure: * No procedures listed *    Medications prior to admission:   Prior to Admission medications    Medication Sig Start Date End Date Taking? Authorizing Provider   amoxicillin-clavulanate (AUGMENTIN) 875-125 MG per tablet Take 1 tablet by mouth 2 times daily 25   Nisha French MD   amphetamine-dextroamphetamine (ADDERALL XR) 15 MG extended release capsule Take 1 capsule by mouth every morning. 25   Nisha French MD   vitamin D (ERGOCALCIFEROL) 1.25 MG (84406 UT) CAPS capsule Take 1 capsule by mouth Twice a Week 25   Nisha French MD   levothyroxine (SYNTHROID) 50 MCG tablet Take 1 tablet by mouth Daily    Nisha French MD   pantoprazole (PROTONIX) 40 MG tablet Take 1 tablet by mouth daily    Nisha French MD   predniSONE (DELTASONE) 20 MG tablet TAKE 3 TABLETS BY MOUTH DAILY FOR THREE DAYS  THEN 2 TABLETS DAILY FOR THREE DAYS  THEN 1 TABLET DAILY FOR THREE DAYS. TAKE WITH FOOD 25   Nisha French MD   sertraline (ZOLOFT) 50 MG tablet Take 2 tablets by mouth daily    Nisha French MD       Current medications:    No current facility-administered medications for this encounter.     No current outpatient medications on file.     Facility-Administered Medications Ordered in Other Encounters   Medication Dose Route Frequency Provider Last Rate Last Admin    metoprolol (LOPRESSOR) injection 5 mg  5 mg IntraVENous Once Nayely Shelley MD        dilTIAZem (CARDIZEM CD) extended release capsule 120 mg  120 mg Oral Daily Brett Duffy MD   120 mg at 25 1032    diphenhydrAMINE (BENADRYL) injection 25 mg  25 mg IntraVENous Nightly PRN Negar Patten MD   25 mg at 25 0006    metoprolol tartrate (LOPRESSOR)

## 2025-04-21 NOTE — PLAN OF CARE
Problem: ABCDS Injury Assessment  Goal: Absence of physical injury  4/21/2025 1633 by Aruna Lozano RN  Outcome: Progressing     Problem: Discharge Planning  Goal: Discharge to home or other facility with appropriate resources  4/21/2025 1633 by Aruna Lozano RN  Outcome: Progressing     Problem: Safety - Adult  Goal: Free from fall injury  4/21/2025 1633 by Aruna Lozano RN  Outcome: Progressing     Problem: Chronic Conditions and Co-morbidities  Goal: Patient's chronic conditions and co-morbidity symptoms are monitored and maintained or improved  4/21/2025 1633 by Aruna Lozano RN  Outcome: Progressing

## 2025-04-21 NOTE — OP NOTE
Operative Note      Patient: Solomon Adams  YOB: 1980  MRN: [id number]    Date of Procedure: 4/21/2025    Procedure #1 : BELKIS    Pre-operative Diagnosis: A Fib    Post-operative Diagnosis: No intracardiac thrombus; severe LV dysfunction, EF 20-25%.    Anesthesia: LMAC    Surgeons: Dr Shelley    Estimated Blood Loss: None    Complications: None    Full report to follow in Echo system            Procedure #2 : DC Cardioversion x2 attempts     Pre-operative Diagnosis: A Fib    Post-operative Diagnosis: A fib    Anesthesia: LMAC    Surgeons: Dr Shelley    DESCRIPTION OF PROCEDURE:   After obtaining written informed consent, the patient was brought to the procedure area.  NPO status was confirmed with the patient. A proper time-out was performed.   Patient  connected to heart rhythm and oxygen monitoring  Anterior and posterior cardioversion pads were placed appropriately.    The patient was sedated by the Anesthesia service.     After appropriate sedation, BELKIS done which revealed no intracardiac thrombus, especially no left atrial or left atrial appendage clot.    Patient was Cardioverted with 200J of synchronized biphasic direct current  converted to sinus, after few minutes went back to A Fib. After sedation was confirmed, 2500J of synchronized biphasic direct current used for cardioversion and was successfully converted to sinus rhythm and remained in Sinus rhythm with PACs.     Complications: The patient tolerated the procedure well without any post procedure complications, dennis, no foal neurologic deficits.      Electronically signed by Nayely Shelley MD on 4/21/2025

## 2025-04-21 NOTE — PROGRESS NOTES
Patient Name: Solomon Adams   Medical Record Number: [id number]  Date: 4/21/2025   Time: 3:10 PM   Room/Bed: Room/bed info not found  Cardioversion Procedure Note  Indication: atrial fibrillation    Consent: The patient was counseled regarding the procedure, its indications, risks, potential complications and alternatives, and any questions were answered. Consent was obtained to proceed.    Pre-Medication: none    Procedure: The patient was placed in the supine position and the chest area was exposed. The cardioversion pads were applied in the standard manner and configuration.    Attempt #1: The defibrillator was set on the synchronous mode and charged to 200 joules.  A charge was then delivered which resulted in conversion to normal sinus rhythm, quickly switching back to Afib.    Attempt #2: The defibrillator was set on the synchronous and charged to 250 joules.  A charge was then delivered which resulted in  conversion to normal sinus rhythm.    The patient tolerated the procedure well.    Complications: None    Electronically Signed by: Emma Neumann Rn

## 2025-04-22 PROBLEM — Z92.241 HISTORY OF ANABOLIC STEROID USE: Status: ACTIVE | Noted: 2025-04-22

## 2025-04-22 LAB
ANION GAP SERPL CALCULATED.3IONS-SCNC: 13 MMOL/L (ref 7–16)
BASOPHILS # BLD: 0.04 K/UL (ref 0–0.2)
BASOPHILS NFR BLD: 0 % (ref 0–2)
BUN SERPL-MCNC: 19 MG/DL (ref 6–20)
CALCIUM SERPL-MCNC: 8 MG/DL (ref 8.6–10.2)
CHLORIDE SERPL-SCNC: 106 MMOL/L (ref 98–107)
CO2 SERPL-SCNC: 19 MMOL/L (ref 22–29)
CREAT SERPL-MCNC: 1.4 MG/DL (ref 0.7–1.2)
EOSINOPHIL # BLD: 0.09 K/UL (ref 0.05–0.5)
EOSINOPHILS RELATIVE PERCENT: 1 % (ref 0–6)
ERYTHROCYTE [DISTWIDTH] IN BLOOD BY AUTOMATED COUNT: 14.3 % (ref 11.5–15)
GFR, ESTIMATED: 66 ML/MIN/1.73M2
GLUCOSE SERPL-MCNC: 107 MG/DL (ref 74–99)
HCT VFR BLD AUTO: 51.6 % (ref 37–54)
HGB BLD-MCNC: 16 G/DL (ref 12.5–16.5)
IMM GRANULOCYTES # BLD AUTO: 0.07 K/UL (ref 0–0.58)
IMM GRANULOCYTES NFR BLD: 1 % (ref 0–5)
LYMPHOCYTES NFR BLD: 1.8 K/UL (ref 1.5–4)
LYMPHOCYTES RELATIVE PERCENT: 16 % (ref 20–42)
MAGNESIUM SERPL-MCNC: 2 MG/DL (ref 1.6–2.6)
MCH RBC QN AUTO: 28.7 PG (ref 26–35)
MCHC RBC AUTO-ENTMCNC: 31 G/DL (ref 32–34.5)
MCV RBC AUTO: 92.6 FL (ref 80–99.9)
MONOCYTES NFR BLD: 0.84 K/UL (ref 0.1–0.95)
MONOCYTES NFR BLD: 7 % (ref 2–12)
NEUTROPHILS NFR BLD: 75 % (ref 43–80)
NEUTS SEG NFR BLD: 8.64 K/UL (ref 1.8–7.3)
PLATELET # BLD AUTO: 225 K/UL (ref 130–450)
PMV BLD AUTO: 10.9 FL (ref 7–12)
POTASSIUM SERPL-SCNC: 4.1 MMOL/L (ref 3.5–5)
RBC # BLD AUTO: 5.57 M/UL (ref 3.8–5.8)
SODIUM SERPL-SCNC: 138 MMOL/L (ref 132–146)
WBC OTHER # BLD: 11.5 K/UL (ref 4.5–11.5)

## 2025-04-22 PROCEDURE — 85025 COMPLETE CBC W/AUTO DIFF WBC: CPT

## 2025-04-22 PROCEDURE — 2580000003 HC RX 258: Performed by: INTERNAL MEDICINE

## 2025-04-22 PROCEDURE — 6370000000 HC RX 637 (ALT 250 FOR IP): Performed by: INTERNAL MEDICINE

## 2025-04-22 PROCEDURE — 2060000000 HC ICU INTERMEDIATE R&B

## 2025-04-22 PROCEDURE — 2500000003 HC RX 250 WO HCPCS: Performed by: INTERNAL MEDICINE

## 2025-04-22 PROCEDURE — 99232 SBSQ HOSP IP/OBS MODERATE 35: CPT | Performed by: INTERNAL MEDICINE

## 2025-04-22 PROCEDURE — 93005 ELECTROCARDIOGRAM TRACING: CPT | Performed by: INTERNAL MEDICINE

## 2025-04-22 PROCEDURE — 6360000002 HC RX W HCPCS: Performed by: INTERNAL MEDICINE

## 2025-04-22 PROCEDURE — 80048 BASIC METABOLIC PNL TOTAL CA: CPT

## 2025-04-22 PROCEDURE — 36415 COLL VENOUS BLD VENIPUNCTURE: CPT

## 2025-04-22 PROCEDURE — 6370000000 HC RX 637 (ALT 250 FOR IP): Performed by: NURSE PRACTITIONER

## 2025-04-22 PROCEDURE — 6360000002 HC RX W HCPCS: Performed by: STUDENT IN AN ORGANIZED HEALTH CARE EDUCATION/TRAINING PROGRAM

## 2025-04-22 PROCEDURE — 83735 ASSAY OF MAGNESIUM: CPT

## 2025-04-22 RX ORDER — METOPROLOL SUCCINATE 50 MG/1
50 TABLET, EXTENDED RELEASE ORAL
Status: DISCONTINUED | OUTPATIENT
Start: 2025-04-24 | End: 2025-04-23

## 2025-04-22 RX ORDER — FUROSEMIDE 20 MG/1
20 TABLET ORAL DAILY
Status: DISCONTINUED | OUTPATIENT
Start: 2025-04-22 | End: 2025-04-25 | Stop reason: HOSPADM

## 2025-04-22 RX ORDER — DIGOXIN 0.25 MG/ML
250 INJECTION INTRAMUSCULAR; INTRAVENOUS ONCE
Status: COMPLETED | OUTPATIENT
Start: 2025-04-22 | End: 2025-04-22

## 2025-04-22 RX ORDER — DILTIAZEM HYDROCHLORIDE 5 MG/ML
10 INJECTION INTRAVENOUS ONCE
Status: COMPLETED | OUTPATIENT
Start: 2025-04-22 | End: 2025-04-22

## 2025-04-22 RX ADMIN — LEVOTHYROXINE SODIUM 50 MCG: 0.05 TABLET ORAL at 06:15

## 2025-04-22 RX ADMIN — WATER 1000 MG: 1 INJECTION INTRAMUSCULAR; INTRAVENOUS; SUBCUTANEOUS at 16:11

## 2025-04-22 RX ADMIN — SACUBITRIL AND VALSARTAN 1 TABLET: 24; 26 TABLET, FILM COATED ORAL at 20:00

## 2025-04-22 RX ADMIN — DOXYCYCLINE HYCLATE 100 MG: 100 CAPSULE ORAL at 07:49

## 2025-04-22 RX ADMIN — APIXABAN 5 MG: 5 TABLET, FILM COATED ORAL at 20:00

## 2025-04-22 RX ADMIN — SODIUM CHLORIDE, PRESERVATIVE FREE 10 ML: 5 INJECTION INTRAVENOUS at 22:25

## 2025-04-22 RX ADMIN — SODIUM CHLORIDE, PRESERVATIVE FREE 10 ML: 5 INJECTION INTRAVENOUS at 20:00

## 2025-04-22 RX ADMIN — SERTRALINE 100 MG: 100 TABLET, FILM COATED ORAL at 07:49

## 2025-04-22 RX ADMIN — FUROSEMIDE 20 MG: 20 TABLET ORAL at 09:52

## 2025-04-22 RX ADMIN — SODIUM CHLORIDE, PRESERVATIVE FREE 10 ML: 5 INJECTION INTRAVENOUS at 06:15

## 2025-04-22 RX ADMIN — DIPHENHYDRAMINE HYDROCHLORIDE 25 MG: 50 INJECTION INTRAMUSCULAR; INTRAVENOUS at 22:25

## 2025-04-22 RX ADMIN — DOXYCYCLINE HYCLATE 100 MG: 100 CAPSULE ORAL at 20:00

## 2025-04-22 RX ADMIN — DILTIAZEM HYDROCHLORIDE 5 MG/HR: 5 INJECTION INTRAVENOUS at 06:17

## 2025-04-22 RX ADMIN — DILTIAZEM HYDROCHLORIDE 10 MG/HR: 5 INJECTION INTRAVENOUS at 17:04

## 2025-04-22 RX ADMIN — APIXABAN 5 MG: 5 TABLET, FILM COATED ORAL at 07:48

## 2025-04-22 RX ADMIN — METOPROLOL SUCCINATE 50 MG: 50 TABLET, EXTENDED RELEASE ORAL at 07:49

## 2025-04-22 RX ADMIN — PANTOPRAZOLE SODIUM 40 MG: 40 TABLET, DELAYED RELEASE ORAL at 07:49

## 2025-04-22 RX ADMIN — DIGOXIN 250 MCG: 0.25 INJECTION INTRAMUSCULAR; INTRAVENOUS at 17:23

## 2025-04-22 RX ADMIN — SACUBITRIL AND VALSARTAN 1 TABLET: 24; 26 TABLET, FILM COATED ORAL at 07:48

## 2025-04-22 RX ADMIN — DILTIAZEM HYDROCHLORIDE 10 MG: 5 INJECTION, SOLUTION INTRAVENOUS at 06:15

## 2025-04-22 ASSESSMENT — PAIN SCALES - GENERAL
PAINLEVEL_OUTOF10: 0

## 2025-04-22 NOTE — CONSULTS
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 (1980) male with a history of HFrEF, most recent EF:    04/21/2025 EF 20-25%    Patient was awake and alert, laying in bed during the consultation and is agreeable to heart failure education. He was engaged and asked appropriate questions throughout the education session.     Barriers identified during consult contributing to HF Hospitalization: none.     [] Limited medication adherence   [] Poor health literacy, education regarding HF medications provided   [] Pill box provided to patient  [] Difficulty affording medications  [] Difficulty obtaining/ managing medications  [] Prescription assistance information given     [x] Not weighing themselves daily  [x] Weight log provided for easy monitoring  [] Scale provided     [x] Not following low sodium diet  [] Food insecurity   [x] 2 gram sodium diet education provided   [] Low sodium recipes provided  [] Sodium free seasoning provided   [] Low sodium meal delivery options given to patient  [] Dietician consulted     [] Lack of transportation to appointments     [] Depression, given chronic illness  [] Primary team notified     [] Goals of care need addressed  [] Palliative care consulted     [] CHF community health worker Yeimy Jones consulted [phone], to assist with     Chart Reviewed:  Diet: ADULT DIET; Regular; Low Fat/Low Chol/High Fiber/2 gm Na   Daily Weights: Patient Vitals for the past 96 hrs (Last 3 readings):   Weight   04/22/25 0016 (!) 145.2 kg (320 lb)   04/21/25 0015 (!) 145.2 kg (320 lb)   04/19/25 0030 (!) 138.3 kg (305 lb)     I/O:   Intake/Output Summary (Last 24 hours) at 4/22/2025 1103  Last data filed at 4/22/2025 0615  Gross per 24 hour   Intake 270 ml   Output --   Net 270 ml       [] Nursing staff/manager notified of inaccurate salvador weights or I/O    Patient Education:  Self 
accessory muscle use or intercostal retractions.  RESPIRATORY: Lung sounds - clear throughout fields. No wheezing, rales or rhonchi.  CARDIOVASCULAR:     No noted carotid bruit.  Heart Ausculation- Tachycardic, IRR, no apparent murmur.   PV: No lower extremity edema. No varicosities. Pedal pulses palpable, no clubbing or cyanosis.   ABDOMEN: Soft, obese, non-tender to light palpation. Bowel sounds present.   MS: Good muscle strength and tone. No atrophy or abnormal movements.   SKIN: Warm and dry. No statis dermatitis or ulcers.  NEURO / PSYCH: Oriented to person, place and time. Speech clear and appropriate. Follows all commands. Pleasant affect.      DATA:    Telemetry: AF with HR 90 to low 100's.     [id number]         Diagnostic:  All diagnostic testing and lab work thus far this admission reviewed in detail.    Labs:   CBC:   Recent Labs     25  1030 25  0500   WBC 13.7* 9.2   HGB 15.1 14.7   HCT 46.0 44.9    219     BMP:   Recent Labs     25  1030 25  0500    139   K 3.9 4.6   CO2 20* 18*   BUN 31* 23*   CREATININE 1.8* 1.5*   LABGLOM 46* 61   CALCIUM 8.8 8.4*     Mag:   Recent Labs     25  1030   MG 1.8       TFT:   Lab Results   Component Value Date    TSH 4.38 (H) 2025    T4FREE 1.0 2025        proBNP:   Lab Results   Component Value Date/Time    PROBNP 1,976 2025 10:30 AM       TROPONIN:  Lab Results   Component Value Date/Time    TROPHS 17 2025 02:10 PM    TROPHS 24 2025 10:30 AM       FASTING LIPID PANEL:  Lab Results   Component Value Date/Time    HDL 28 2025 05:00 AM     LIVER PROFILE:  Recent Labs     25  1030   AST 73*   ALT 88*     COVID19:   Recent Labs     25  1030   COVID19 Not Detected     CTA pulmonary with contrast: 2025  No evidence of pulmonary embolism.  Mild patchy ground-glass opacities in the lingula and left lower lobe may be  infectious or inflammatory.    Chest x-ray:

## 2025-04-22 NOTE — CARE COORDINATION
CARE MANAGEMENT.... Patient had successful cardioversion yesterday, but is back in Afib this morning. Placed on iv cardizem gtt. Cardio/CHF nurse on board. Continues on iv rocephin, po doxy, iv lasix qd, toprol xl, entresto and eliquis. (Discount cards provided for both entresto/eliquis). Tolerating room air. EF 20-25%. [facility] following for Life Vest. Discharge plan is home. Will follow for needs.

## 2025-04-23 LAB
ANION GAP SERPL CALCULATED.3IONS-SCNC: 11 MMOL/L (ref 7–16)
BASOPHILS # BLD: 0.04 K/UL (ref 0–0.2)
BASOPHILS NFR BLD: 0 % (ref 0–2)
BUN SERPL-MCNC: 12 MG/DL (ref 6–20)
CALCIUM SERPL-MCNC: 7.9 MG/DL (ref 8.6–10.2)
CHLORIDE SERPL-SCNC: 107 MMOL/L (ref 98–107)
CO2 SERPL-SCNC: 21 MMOL/L (ref 22–29)
CREAT SERPL-MCNC: 1.1 MG/DL (ref 0.7–1.2)
EOSINOPHIL # BLD: 0.14 K/UL (ref 0.05–0.5)
EOSINOPHILS RELATIVE PERCENT: 1 % (ref 0–6)
ERYTHROCYTE [DISTWIDTH] IN BLOOD BY AUTOMATED COUNT: 14.4 % (ref 11.5–15)
GFR, ESTIMATED: 81 ML/MIN/1.73M2
GLUCOSE SERPL-MCNC: 83 MG/DL (ref 74–99)
HCT VFR BLD AUTO: 51 % (ref 37–54)
HGB BLD-MCNC: 16.6 G/DL (ref 12.5–16.5)
IMM GRANULOCYTES # BLD AUTO: 0.09 K/UL (ref 0–0.58)
IMM GRANULOCYTES NFR BLD: 1 % (ref 0–5)
LYMPHOCYTES NFR BLD: 2.28 K/UL (ref 1.5–4)
LYMPHOCYTES RELATIVE PERCENT: 19 % (ref 20–42)
MCH RBC QN AUTO: 28.9 PG (ref 26–35)
MCHC RBC AUTO-ENTMCNC: 32.5 G/DL (ref 32–34.5)
MCV RBC AUTO: 88.9 FL (ref 80–99.9)
MICROORGANISM SPEC CULT: NORMAL
MICROORGANISM SPEC CULT: NORMAL
MONOCYTES NFR BLD: 0.81 K/UL (ref 0.1–0.95)
MONOCYTES NFR BLD: 7 % (ref 2–12)
NEUTROPHILS NFR BLD: 73 % (ref 43–80)
NEUTS SEG NFR BLD: 8.96 K/UL (ref 1.8–7.3)
PLATELET # BLD AUTO: 229 K/UL (ref 130–450)
PMV BLD AUTO: 10.8 FL (ref 7–12)
POTASSIUM SERPL-SCNC: 3.9 MMOL/L (ref 3.5–5)
RBC # BLD AUTO: 5.74 M/UL (ref 3.8–5.8)
SERVICE CMNT-IMP: NORMAL
SERVICE CMNT-IMP: NORMAL
SODIUM SERPL-SCNC: 139 MMOL/L (ref 132–146)
SPECIMEN DESCRIPTION: NORMAL
SPECIMEN DESCRIPTION: NORMAL
WBC OTHER # BLD: 12.3 K/UL (ref 4.5–11.5)

## 2025-04-23 PROCEDURE — 6360000002 HC RX W HCPCS: Performed by: NURSE PRACTITIONER

## 2025-04-23 PROCEDURE — 36415 COLL VENOUS BLD VENIPUNCTURE: CPT

## 2025-04-23 PROCEDURE — 6370000000 HC RX 637 (ALT 250 FOR IP): Performed by: INTERNAL MEDICINE

## 2025-04-23 PROCEDURE — 2500000003 HC RX 250 WO HCPCS: Performed by: INTERNAL MEDICINE

## 2025-04-23 PROCEDURE — 80048 BASIC METABOLIC PNL TOTAL CA: CPT

## 2025-04-23 PROCEDURE — 99232 SBSQ HOSP IP/OBS MODERATE 35: CPT | Performed by: INTERNAL MEDICINE

## 2025-04-23 PROCEDURE — 2580000003 HC RX 258: Performed by: NURSE PRACTITIONER

## 2025-04-23 PROCEDURE — 6360000002 HC RX W HCPCS: Performed by: STUDENT IN AN ORGANIZED HEALTH CARE EDUCATION/TRAINING PROGRAM

## 2025-04-23 PROCEDURE — 6370000000 HC RX 637 (ALT 250 FOR IP): Performed by: NURSE PRACTITIONER

## 2025-04-23 PROCEDURE — 6360000002 HC RX W HCPCS: Performed by: INTERNAL MEDICINE

## 2025-04-23 PROCEDURE — 2060000000 HC ICU INTERMEDIATE R&B

## 2025-04-23 PROCEDURE — 2580000003 HC RX 258: Performed by: INTERNAL MEDICINE

## 2025-04-23 PROCEDURE — 85025 COMPLETE CBC W/AUTO DIFF WBC: CPT

## 2025-04-23 RX ORDER — DIGOXIN 250 MCG
250 TABLET ORAL DAILY
Status: DISCONTINUED | OUTPATIENT
Start: 2025-04-23 | End: 2025-04-23

## 2025-04-23 RX ORDER — METOPROLOL SUCCINATE 50 MG/1
50 TABLET, EXTENDED RELEASE ORAL 2 TIMES DAILY
Status: DISCONTINUED | OUTPATIENT
Start: 2025-04-23 | End: 2025-04-25 | Stop reason: HOSPADM

## 2025-04-23 RX ORDER — METOPROLOL SUCCINATE 50 MG/1
50 TABLET, EXTENDED RELEASE ORAL DAILY
Status: DISCONTINUED | OUTPATIENT
Start: 2025-04-24 | End: 2025-04-23

## 2025-04-23 RX ORDER — DIGOXIN 0.25 MG/ML
250 INJECTION INTRAMUSCULAR; INTRAVENOUS ONCE
Status: COMPLETED | OUTPATIENT
Start: 2025-04-23 | End: 2025-04-23

## 2025-04-23 RX ORDER — AMIODARONE HYDROCHLORIDE 200 MG/1
200 TABLET ORAL 2 TIMES DAILY
Status: DISCONTINUED | OUTPATIENT
Start: 2025-04-23 | End: 2025-04-25 | Stop reason: HOSPADM

## 2025-04-23 RX ADMIN — SACUBITRIL AND VALSARTAN 1 TABLET: 24; 26 TABLET, FILM COATED ORAL at 20:25

## 2025-04-23 RX ADMIN — FUROSEMIDE 20 MG: 20 TABLET ORAL at 08:30

## 2025-04-23 RX ADMIN — DIGOXIN 250 MCG: 0.25 INJECTION INTRAMUSCULAR; INTRAVENOUS at 10:42

## 2025-04-23 RX ADMIN — APIXABAN 5 MG: 5 TABLET, FILM COATED ORAL at 20:25

## 2025-04-23 RX ADMIN — SERTRALINE 100 MG: 100 TABLET, FILM COATED ORAL at 08:30

## 2025-04-23 RX ADMIN — APIXABAN 5 MG: 5 TABLET, FILM COATED ORAL at 08:30

## 2025-04-23 RX ADMIN — LEVOTHYROXINE SODIUM 50 MCG: 0.05 TABLET ORAL at 05:15

## 2025-04-23 RX ADMIN — PANTOPRAZOLE SODIUM 40 MG: 40 TABLET, DELAYED RELEASE ORAL at 08:30

## 2025-04-23 RX ADMIN — AMIODARONE HYDROCHLORIDE 150 MG: 50 INJECTION, SOLUTION INTRAVENOUS at 13:49

## 2025-04-23 RX ADMIN — DIGOXIN 250 MCG: 0.25 TABLET ORAL at 10:43

## 2025-04-23 RX ADMIN — DILTIAZEM HYDROCHLORIDE 5 MG/HR: 5 INJECTION INTRAVENOUS at 10:47

## 2025-04-23 RX ADMIN — SODIUM CHLORIDE, PRESERVATIVE FREE 10 ML: 5 INJECTION INTRAVENOUS at 08:30

## 2025-04-23 RX ADMIN — METOPROLOL SUCCINATE 50 MG: 50 TABLET, EXTENDED RELEASE ORAL at 18:55

## 2025-04-23 RX ADMIN — AMIODARONE HYDROCHLORIDE 200 MG: 200 TABLET ORAL at 18:55

## 2025-04-23 RX ADMIN — DOXYCYCLINE HYCLATE 100 MG: 100 CAPSULE ORAL at 08:30

## 2025-04-23 RX ADMIN — SACUBITRIL AND VALSARTAN 1 TABLET: 24; 26 TABLET, FILM COATED ORAL at 08:30

## 2025-04-23 RX ADMIN — SODIUM CHLORIDE, PRESERVATIVE FREE 10 ML: 5 INJECTION INTRAVENOUS at 20:26

## 2025-04-23 RX ADMIN — DIPHENHYDRAMINE HYDROCHLORIDE 25 MG: 50 INJECTION INTRAMUSCULAR; INTRAVENOUS at 20:25

## 2025-04-23 ASSESSMENT — PAIN SCALES - GENERAL
PAINLEVEL_OUTOF10: 0
PAINLEVEL_OUTOF10: 0

## 2025-04-23 NOTE — CARE COORDINATION
CARE MANAGEMENT..... Patient stable for discharge per pcp when ok with cardio and he is anxious to leave. Conflicting notes between cardio and cardio NP regarding discharge and patient was told by \"2\" physicians that he is being discharged. Perfect Serve message sent to RIVKA Tracey NP for clarification - await input. Patient's Life Vest at bedside. Will follow.     Received clarification from Cardio NP. Cardio adjusting meds and anticipates discharge tomorrow pending his response. Patient updated, is agreeable to plan, but is not happy.

## 2025-04-24 ENCOUNTER — ANESTHESIA EVENT (OUTPATIENT)
Dept: INPATIENT UNIT | Age: 45
DRG: 308 | End: 2025-04-24
Payer: COMMERCIAL

## 2025-04-24 ENCOUNTER — APPOINTMENT (OUTPATIENT)
Dept: INPATIENT UNIT | Age: 45
DRG: 308 | End: 2025-04-24
Payer: COMMERCIAL

## 2025-04-24 ENCOUNTER — ANESTHESIA EVENT (OUTPATIENT)
Dept: OPERATING ROOM | Age: 45
DRG: 308 | End: 2025-04-24
Payer: COMMERCIAL

## 2025-04-24 ENCOUNTER — ANESTHESIA (OUTPATIENT)
Dept: INPATIENT UNIT | Age: 45
DRG: 308 | End: 2025-04-24
Payer: COMMERCIAL

## 2025-04-24 ENCOUNTER — ANESTHESIA (OUTPATIENT)
Dept: OPERATING ROOM | Age: 45
DRG: 308 | End: 2025-04-24
Payer: COMMERCIAL

## 2025-04-24 LAB
EKG ATRIAL RATE: 60 BPM
EKG ATRIAL RATE: 94 BPM
EKG P AXIS: 36 DEGREES
EKG P-R INTERVAL: 142 MS
EKG Q-T INTERVAL: 322 MS
EKG Q-T INTERVAL: 382 MS
EKG QRS DURATION: 82 MS
EKG QRS DURATION: 82 MS
EKG QTC CALCULATION (BAZETT): 477 MS
EKG QTC CALCULATION (BAZETT): 507 MS
EKG R AXIS: -40 DEGREES
EKG R AXIS: -49 DEGREES
EKG T AXIS: 190 DEGREES
EKG T AXIS: 198 DEGREES
EKG VENTRICULAR RATE: 149 BPM
EKG VENTRICULAR RATE: 94 BPM

## 2025-04-24 PROCEDURE — 6370000000 HC RX 637 (ALT 250 FOR IP): Performed by: NURSE PRACTITIONER

## 2025-04-24 PROCEDURE — 6370000000 HC RX 637 (ALT 250 FOR IP): Performed by: INTERNAL MEDICINE

## 2025-04-24 PROCEDURE — 7100000011 HC PHASE II RECOVERY - ADDTL 15 MIN: Performed by: ANESTHESIOLOGY

## 2025-04-24 PROCEDURE — 3700000001 HC ADD 15 MINUTES (ANESTHESIA): Performed by: ANESTHESIOLOGY

## 2025-04-24 PROCEDURE — 2580000003 HC RX 258: Performed by: INTERNAL MEDICINE

## 2025-04-24 PROCEDURE — 99233 SBSQ HOSP IP/OBS HIGH 50: CPT | Performed by: STUDENT IN AN ORGANIZED HEALTH CARE EDUCATION/TRAINING PROGRAM

## 2025-04-24 PROCEDURE — 2500000003 HC RX 250 WO HCPCS: Performed by: INTERNAL MEDICINE

## 2025-04-24 PROCEDURE — 6360000002 HC RX W HCPCS: Performed by: INTERNAL MEDICINE

## 2025-04-24 PROCEDURE — 92960 CARDIOVERSION ELECTRIC EXT: CPT | Performed by: ANESTHESIOLOGY

## 2025-04-24 PROCEDURE — 3700000000 HC ANESTHESIA ATTENDED CARE: Performed by: ANESTHESIOLOGY

## 2025-04-24 PROCEDURE — 2580000003 HC RX 258: Performed by: NURSE ANESTHETIST, CERTIFIED REGISTERED

## 2025-04-24 PROCEDURE — 6360000002 HC RX W HCPCS

## 2025-04-24 PROCEDURE — 6360000002 HC RX W HCPCS: Performed by: NURSE ANESTHETIST, CERTIFIED REGISTERED

## 2025-04-24 PROCEDURE — 7100000010 HC PHASE II RECOVERY - FIRST 15 MIN: Performed by: ANESTHESIOLOGY

## 2025-04-24 PROCEDURE — 2060000000 HC ICU INTERMEDIATE R&B

## 2025-04-24 RX ORDER — PROPOFOL 10 MG/ML
INJECTION, EMULSION INTRAVENOUS
Status: DISCONTINUED | OUTPATIENT
Start: 2025-04-24 | End: 2025-04-24 | Stop reason: SDUPTHER

## 2025-04-24 RX ORDER — SODIUM CHLORIDE 9 MG/ML
INJECTION, SOLUTION INTRAVENOUS
Status: DISCONTINUED | OUTPATIENT
Start: 2025-04-24 | End: 2025-04-24 | Stop reason: SDUPTHER

## 2025-04-24 RX ADMIN — SODIUM CHLORIDE, PRESERVATIVE FREE 10 ML: 5 INJECTION INTRAVENOUS at 08:54

## 2025-04-24 RX ADMIN — LEVOTHYROXINE SODIUM 50 MCG: 0.05 TABLET ORAL at 06:15

## 2025-04-24 RX ADMIN — SACUBITRIL AND VALSARTAN 1 TABLET: 24; 26 TABLET, FILM COATED ORAL at 20:39

## 2025-04-24 RX ADMIN — METOPROLOL SUCCINATE 50 MG: 50 TABLET, EXTENDED RELEASE ORAL at 20:39

## 2025-04-24 RX ADMIN — ONDANSETRON 4 MG: 4 TABLET, ORALLY DISINTEGRATING ORAL at 08:51

## 2025-04-24 RX ADMIN — PROPOFOL 100 MG: 10 INJECTION, EMULSION INTRAVENOUS at 14:36

## 2025-04-24 RX ADMIN — APIXABAN 5 MG: 5 TABLET, FILM COATED ORAL at 20:39

## 2025-04-24 RX ADMIN — APIXABAN 5 MG: 5 TABLET, FILM COATED ORAL at 08:54

## 2025-04-24 RX ADMIN — METOPROLOL SUCCINATE 50 MG: 50 TABLET, EXTENDED RELEASE ORAL at 08:53

## 2025-04-24 RX ADMIN — SERTRALINE 100 MG: 100 TABLET, FILM COATED ORAL at 08:54

## 2025-04-24 RX ADMIN — AMIODARONE HYDROCHLORIDE 1 MG/MIN: 50 INJECTION, SOLUTION INTRAVENOUS at 16:53

## 2025-04-24 RX ADMIN — SODIUM CHLORIDE: 9 INJECTION, SOLUTION INTRAVENOUS at 14:24

## 2025-04-24 RX ADMIN — AMIODARONE HYDROCHLORIDE 200 MG: 200 TABLET ORAL at 20:39

## 2025-04-24 RX ADMIN — AMIODARONE HYDROCHLORIDE 200 MG: 200 TABLET ORAL at 08:54

## 2025-04-24 RX ADMIN — PANTOPRAZOLE SODIUM 40 MG: 40 TABLET, DELAYED RELEASE ORAL at 08:54

## 2025-04-24 RX ADMIN — AMIODARONE HYDROCHLORIDE 150 MG: 50 INJECTION, SOLUTION INTRAVENOUS at 16:40

## 2025-04-24 RX ADMIN — PROPOFOL 100 MG: 10 INJECTION, EMULSION INTRAVENOUS at 14:59

## 2025-04-24 RX ADMIN — SACUBITRIL AND VALSARTAN 1 TABLET: 24; 26 TABLET, FILM COATED ORAL at 08:53

## 2025-04-24 RX ADMIN — FUROSEMIDE 20 MG: 20 TABLET ORAL at 08:53

## 2025-04-24 ASSESSMENT — ENCOUNTER SYMPTOMS
SHORTNESS OF BREATH: 1
SHORTNESS OF BREATH: 1

## 2025-04-24 ASSESSMENT — PAIN SCALES - GENERAL: PAINLEVEL_OUTOF10: 0

## 2025-04-24 NOTE — ANESTHESIA POSTPROCEDURE EVALUATION
Department of Anesthesiology  Postprocedure Note    Patient: Solomon Adams  MRN: [id number]  YOB: 1980  Date of evaluation: 4/24/2025    Procedure Summary       Date: 04/24/25 Room / Location: [facility] Stage I    Anesthesia Start: 1424 Anesthesia Stop: 1510    Procedure: CARDIOVERSION Diagnosis:     Scheduled Providers:  Responsible Provider: Radha Lopez MD    Anesthesia Type: MAC ASA Status: 3            Anesthesia Type: MAC    Mu Phase I:      Mu Phase II:      Anesthesia Post Evaluation    Patient location during evaluation: PACU  Patient participation: complete - patient participated  Level of consciousness: awake and alert  Pain score: 0  Airway patency: patent  Nausea & Vomiting: no nausea and no vomiting  Cardiovascular status: hemodynamically stable  Respiratory status: spontaneous ventilation, room air, nonlabored ventilation and acceptable  Hydration status: stable  Comments: After 3 shocks pt remain in NSR.  Pt has no questions or concerns r/t anesthesia.  Awaiting transport back to room.    Pain management: adequate and satisfactory to patient        No notable events documented.

## 2025-04-24 NOTE — PLAN OF CARE
Problem: ABCDS Injury Assessment  Goal: Absence of physical injury  4/24/2025 0416 by Radha Cruz RN  Outcome: Progressing     Problem: Discharge Planning  Goal: Discharge to home or other facility with appropriate resources  4/24/2025 0416 by Radha Cruz RN  Outcome: Progressing     Problem: Safety - Adult  Goal: Free from fall injury  4/24/2025 0416 by Radha Cruz RN  Outcome: Progressing     Problem: Chronic Conditions and Co-morbidities  Goal: Patient's chronic conditions and co-morbidity symptoms are monitored and maintained or improved  4/24/2025 0416 by Radha Cruz RN  Outcome: Progressing     Problem: Pain  Goal: Verbalizes/displays adequate comfort level or baseline comfort level  4/24/2025 0416 by Radha Cruz RN  Outcome: Progressing

## 2025-04-24 NOTE — PLAN OF CARE
Problem: ABCDS Injury Assessment  Goal: Absence of physical injury  4/23/2025 2125 by Asia Kruse RN  Outcome: Progressing     Problem: Discharge Planning  Goal: Discharge to home or other facility with appropriate resources  4/23/2025 2125 by Asia Kruse RN  Outcome: Progressing     Problem: Safety - Adult  Goal: Free from fall injury  4/23/2025 2125 by Asia Kruse RN  Outcome: Progressing     Problem: Chronic Conditions and Co-morbidities  Goal: Patient's chronic conditions and co-morbidity symptoms are monitored and maintained or improved  4/23/2025 2125 by Asia Kruse RN  Outcome: Progressing     Problem: Pain  Goal: Verbalizes/displays adequate comfort level or baseline comfort level  4/23/2025 2125 by Asia Kruse RN  Outcome: Progressing

## 2025-04-24 NOTE — ANESTHESIA PRE PROCEDURE
(ROBITUSSIN DM) 100-10 MG/5ML syrup 5 mL  5 mL Oral Q4H PRN Symone Bedoya MD           Allergies:    Allergies   Allergen Reactions    Iodinated Contrast Media Anaphylaxis, Shortness Of Breath and Itching       Problem List:    Patient Active Problem List   Diagnosis Code    New onset atrial fibrillation (HCC) I48.91    Acute renal failure N17.9    Acute on chronic heart failure with preserved ejection fraction (HCC) I50.33    Morbid obesity (HCC) E66.01    History of anabolic steroid use Z92.241       Past Medical History:        Diagnosis Date    Atrial fibrillation with RVR (HCC)     Cardiomyopathy (HCC)     Decreased cardiac ejection fraction     GERD (gastroesophageal reflux disease)     History of cardioversion     History of cardioversion     Hypothyroid     Pneumonia     S/P transesophageal echocardiogram (BELKIS)        Past Surgical History:  History reviewed. No pertinent surgical history.    Social History:    Social History     Tobacco Use    Smoking status: Not on file    Smokeless tobacco: Not on file   Substance Use Topics    Alcohol use: Not on file                                Counseling given: Not Answered      Vital Signs (Current):   Vitals:    04/24/25 0824 04/24/25 1145 04/24/25 1313 04/24/25 1333   BP: (!) 119/90 114/75 (!) 148/102    Pulse: (!) 119 54 69    Resp: 18 18 19    Temp: 98.1 °F (36.7 °C) 98.2 °F (36.8 °C) 98 °F (36.7 °C)    TempSrc: Oral Oral     SpO2: 97% 96%     Weight:    (!) 142.9 kg (315 lb)   Height:                                                  BP Readings from Last 3 Encounters:   04/24/25 (!) 148/102   04/21/25 98/69       NPO Status: Time of last liquid consumption: 2230                        Time of last solid consumption: 2030                        Date of last liquid consumption: 04/23/25                        Date of last solid food consumption: 04/23/25    BMI:   Wt Readings from Last 3 Encounters:   04/24/25 (!) 142.9 kg (315 lb)     Body mass index is

## 2025-04-24 NOTE — CARE COORDINATION
CARE MANAGEMENT.. Attempted iv amiodarone gtt and iv cardizem gtt with conversion to po, but patient remains AFib RVR. Plan for external cardioversion today. Started po amiodarone. Continues on toprol xl, entresto and eliquis. (Discount cards provided for both entresto/eliquis). Iv lasix switched to po and iv/po antibiotics stopped. Life Vest in room. Discharge plan is home. No needs. Will follow and assist accordingly.

## 2025-04-24 NOTE — PLAN OF CARE
Problem: ABCDS Injury Assessment  Goal: Absence of physical injury  4/24/2025 1632 by Mike Henao RN  Outcome: Progressing     Problem: Discharge Planning  Goal: Discharge to home or other facility with appropriate resources  4/24/2025 1632 by Mike Henao RN  Outcome: Progressing     Problem: Safety - Adult  Goal: Free from fall injury  4/24/2025 1632 by Mike Henao RN  Outcome: Progressing     Problem: Chronic Conditions and Co-morbidities  Goal: Patient's chronic conditions and co-morbidity symptoms are monitored and maintained or improved  4/24/2025 1632 by Mike Henao RN  Outcome: Progressing     Problem: Pain  Goal: Verbalizes/displays adequate comfort level or baseline comfort level  4/24/2025 1632 by Mike Henao RN  Outcome: Progressing

## 2025-04-24 NOTE — ANESTHESIA PRE PROCEDURE
Department of Anesthesiology  Preprocedure Note       Name:  Solomon Adams   Age:  44 y.o.  :  1980                                          MRN:  [id number]         Date:  2025      Surgeon: Karsten     Procedure: RAMIREZ    Medications prior to admission:   Prior to Admission medications    Medication Sig Start Date End Date Taking? Authorizing Provider   amoxicillin-clavulanate (AUGMENTIN) 875-125 MG per tablet Take 1 tablet by mouth 2 times daily 25  Yes Nisha Frenhc MD   amphetamine-dextroamphetamine (ADDERALL XR) 15 MG extended release capsule Take 1 capsule by mouth every morning. 25  Yes Nisha French MD   vitamin D (ERGOCALCIFEROL) 1.25 MG (33234 UT) CAPS capsule Take 1 capsule by mouth Twice a Week 25  Yes Nisha French MD   levothyroxine (SYNTHROID) 50 MCG tablet Take 1 tablet by mouth Daily   Yes Nisha French MD   pantoprazole (PROTONIX) 40 MG tablet Take 1 tablet by mouth daily   Yes Nisha French MD   predniSONE (DELTASONE) 20 MG tablet TAKE 3 TABLETS BY MOUTH DAILY FOR THREE DAYS  THEN 2 TABLETS DAILY FOR THREE DAYS  THEN 1 TABLET DAILY FOR THREE DAYS. TAKE WITH FOOD 25  Yes Nisha French MD   sertraline (ZOLOFT) 50 MG tablet Take 2 tablets by mouth daily   Yes Nisha French MD       Current medications:    Current Facility-Administered Medications   Medication Dose Route Frequency Provider Last Rate Last Admin    metoprolol succinate (TOPROL XL) extended release tablet 50 mg  50 mg Oral BID Nayely Shelley MD   50 mg at 25 0853    amiodarone (CORDARONE) tablet 200 mg  200 mg Oral BID Juju Tracey APN   200 mg at 25 0854    furosemide (LASIX) tablet 20 mg  20 mg Oral Daily Nayely Shelley MD   20 mg at 25 0853    sacubitril-valsartan (ENTRESTO) 24-26 MG per tablet 1 tablet  1 tablet Oral BID Nayely Shelley MD   1 tablet at 25 0853    diphenhydrAMINE (BENADRYL) injection 25 mg

## 2025-04-24 NOTE — OP NOTE
Operative Note      Patient: Solomon Adams  YOB: 1980  MRN: [id number]    Date of Procedure: 4/24/2025    Procedure: Direct Current Cardioversion x 3    Pre-operative Diagnosis: A Fib with RVR    Post-operative Diagnosis: A Fib    Anesthesia: LMAC    Surgeon: Dr Shelley    DESCRIPTION OF PROCEDURE:   After obtaining written informed consent, the patient was brought to the procedure area.  NPO status was confirmed with the patient. A proper time-out was performed.   Patient  connected to heart rhythm and oxygen monitoring  Anterior and posterior cardioversion pads were placed appropriately.    The patient was sedated by the Anesthesia service.     After appropriate sedation, BELKIS done which revealed no intracardiac thrombus, especially no left atrial or left atrial appendage clot.    Patient was Cardioverted with 200J of synchronized biphasic direct current  which was unsuccessful.  Initially converted, after 5-6 minutes went back to A fib with RVR.  He was awake and alert; Case discussed with Anesthesia staff at bed side; he was put under conscious sedation again and 300J of synchronized biphasic direct current used for cardioversion and which was unsuccessful.  Converted to sinus for few seconds and went back t oA Fib with RVR. Finally after sedation confirmed, 360J of synchronized biphasic direct current used for cardioversion initially converted to sinus rhythm and after 5-10 minutes, went back to A fib with RVR.       Complications: The patient tolerated the procedure well without any post procedure complications, dennis, no foal neurologic deficits.    Electronically signed by Nayely Shelley MD on 4/24/2025     His family, wife, updated about above.    Nayely Shelley MD

## 2025-04-25 ENCOUNTER — HOSPITAL ENCOUNTER (INPATIENT)
Age: 45
LOS: 4 days | Discharge: HOME | DRG: 286 | End: 2025-04-29
Payer: COMMERCIAL

## 2025-04-25 VITALS
OXYGEN SATURATION: 96 % | HEIGHT: 72 IN | WEIGHT: 315 LBS | SYSTOLIC BLOOD PRESSURE: 93 MMHG | TEMPERATURE: 97.9 F | DIASTOLIC BLOOD PRESSURE: 77 MMHG | BODY MASS INDEX: 42.66 KG/M2 | RESPIRATION RATE: 18 BRPM | HEART RATE: 141 BPM

## 2025-04-25 DIAGNOSIS — Z92.241 HISTORY OF ANABOLIC STEROID USE: ICD-10-CM

## 2025-04-25 DIAGNOSIS — I42.8 OTHER CARDIOMYOPATHY (HCC): ICD-10-CM

## 2025-04-25 DIAGNOSIS — I50.20 HFREF (HEART FAILURE WITH REDUCED EJECTION FRACTION) (HCC): ICD-10-CM

## 2025-04-25 DIAGNOSIS — I21.4 NSTEMI (NON-ST ELEVATED MYOCARDIAL INFARCTION) (HCC): ICD-10-CM

## 2025-04-25 DIAGNOSIS — I48.91 ATRIAL FIBRILLATION WITH RVR (HCC): ICD-10-CM

## 2025-04-25 DIAGNOSIS — I50.41 ACUTE COMBINED SYSTOLIC AND DIASTOLIC CONGESTIVE HEART FAILURE (HCC): Primary | ICD-10-CM

## 2025-04-25 PROBLEM — I48.19 PERSISTENT ATRIAL FIBRILLATION (HCC): Status: ACTIVE | Noted: 2025-04-25

## 2025-04-25 LAB
ANION GAP SERPL CALCULATED.3IONS-SCNC: 10 MMOL/L (ref 7–16)
BASOPHILS # BLD: 0.05 K/UL (ref 0–0.2)
BASOPHILS NFR BLD: 0 % (ref 0–2)
BUN SERPL-MCNC: 17 MG/DL (ref 6–20)
CALCIUM SERPL-MCNC: 8.3 MG/DL (ref 8.6–10.2)
CHLORIDE SERPL-SCNC: 106 MMOL/L (ref 98–107)
CO2 SERPL-SCNC: 22 MMOL/L (ref 22–29)
CREAT SERPL-MCNC: 1.5 MG/DL (ref 0.7–1.2)
EKG ATRIAL RATE: 33 BPM
EKG Q-T INTERVAL: 290 MS
EKG QRS DURATION: 88 MS
EKG QTC CALCULATION (BAZETT): 471 MS
EKG R AXIS: -28 DEGREES
EKG T AXIS: 178 DEGREES
EKG VENTRICULAR RATE: 159 BPM
EOSINOPHIL # BLD: 0.17 K/UL (ref 0.05–0.5)
EOSINOPHILS RELATIVE PERCENT: 1 % (ref 0–6)
ERYTHROCYTE [DISTWIDTH] IN BLOOD BY AUTOMATED COUNT: 15 % (ref 11.5–15)
GFR, ESTIMATED: 61 ML/MIN/1.73M2
GLUCOSE SERPL-MCNC: 102 MG/DL (ref 74–99)
HCT VFR BLD AUTO: 54.2 % (ref 37–54)
HGB BLD-MCNC: 17.5 G/DL (ref 12.5–16.5)
IMM GRANULOCYTES # BLD AUTO: 0.08 K/UL (ref 0–0.58)
IMM GRANULOCYTES NFR BLD: 1 % (ref 0–5)
LYMPHOCYTES NFR BLD: 2.35 K/UL (ref 1.5–4)
LYMPHOCYTES RELATIVE PERCENT: 18 % (ref 20–42)
MCH RBC QN AUTO: 28.8 PG (ref 26–35)
MCHC RBC AUTO-ENTMCNC: 32.3 G/DL (ref 32–34.5)
MCV RBC AUTO: 89.1 FL (ref 80–99.9)
MONOCYTES NFR BLD: 0.96 K/UL (ref 0.1–0.95)
MONOCYTES NFR BLD: 8 % (ref 2–12)
NEUTROPHILS NFR BLD: 72 % (ref 43–80)
NEUTS SEG NFR BLD: 9.19 K/UL (ref 1.8–7.3)
PLATELET # BLD AUTO: 264 K/UL (ref 130–450)
PMV BLD AUTO: 10 FL (ref 7–12)
POTASSIUM SERPL-SCNC: 4.1 MMOL/L (ref 3.5–5)
RBC # BLD AUTO: 6.08 M/UL (ref 3.8–5.8)
SODIUM SERPL-SCNC: 138 MMOL/L (ref 132–146)
WBC OTHER # BLD: 12.8 K/UL (ref 4.5–11.5)

## 2025-04-25 PROCEDURE — 6360000002 HC RX W HCPCS: Performed by: INTERNAL MEDICINE

## 2025-04-25 PROCEDURE — 2580000003 HC RX 258: Performed by: INTERNAL MEDICINE

## 2025-04-25 PROCEDURE — 6370000000 HC RX 637 (ALT 250 FOR IP): Performed by: INTERNAL MEDICINE

## 2025-04-25 PROCEDURE — 2500000003 HC RX 250 WO HCPCS: Performed by: INTERNAL MEDICINE

## 2025-04-25 PROCEDURE — 85025 COMPLETE CBC W/AUTO DIFF WBC: CPT

## 2025-04-25 PROCEDURE — 93010 ELECTROCARDIOGRAM REPORT: CPT | Performed by: INTERNAL MEDICINE

## 2025-04-25 PROCEDURE — 99235 HOSP IP/OBS SAME DATE MOD 70: CPT | Performed by: INTERNAL MEDICINE

## 2025-04-25 PROCEDURE — 2140000000 HC CCU INTERMEDIATE R&B

## 2025-04-25 PROCEDURE — 80048 BASIC METABOLIC PNL TOTAL CA: CPT

## 2025-04-25 PROCEDURE — 6370000000 HC RX 637 (ALT 250 FOR IP): Performed by: NURSE PRACTITIONER

## 2025-04-25 PROCEDURE — 99233 SBSQ HOSP IP/OBS HIGH 50: CPT | Performed by: STUDENT IN AN ORGANIZED HEALTH CARE EDUCATION/TRAINING PROGRAM

## 2025-04-25 RX ORDER — AMIODARONE HYDROCHLORIDE 200 MG/1
200 TABLET ORAL 2 TIMES DAILY
Status: DISCONTINUED | OUTPATIENT
Start: 2025-04-25 | End: 2025-04-29 | Stop reason: HOSPADM

## 2025-04-25 RX ORDER — SERTRALINE HYDROCHLORIDE 100 MG/1
100 TABLET, FILM COATED ORAL DAILY
Status: DISCONTINUED | OUTPATIENT
Start: 2025-04-26 | End: 2025-04-29 | Stop reason: HOSPADM

## 2025-04-25 RX ORDER — ACETAMINOPHEN 325 MG/1
650 TABLET ORAL EVERY 6 HOURS PRN
Status: DISCONTINUED | OUTPATIENT
Start: 2025-04-25 | End: 2025-04-28 | Stop reason: SDUPTHER

## 2025-04-25 RX ORDER — DIPHENHYDRAMINE HYDROCHLORIDE 50 MG/ML
25 INJECTION, SOLUTION INTRAMUSCULAR; INTRAVENOUS NIGHTLY PRN
Status: DISCONTINUED | OUTPATIENT
Start: 2025-04-25 | End: 2025-04-29 | Stop reason: HOSPADM

## 2025-04-25 RX ORDER — GUAIFENESIN/DEXTROMETHORPHAN 100-10MG/5
5 SYRUP ORAL EVERY 4 HOURS PRN
Status: DISCONTINUED | OUTPATIENT
Start: 2025-04-25 | End: 2025-04-29 | Stop reason: HOSPADM

## 2025-04-25 RX ORDER — ACETAMINOPHEN 650 MG/1
650 SUPPOSITORY RECTAL EVERY 6 HOURS PRN
Status: DISCONTINUED | OUTPATIENT
Start: 2025-04-25 | End: 2025-04-28 | Stop reason: SDUPTHER

## 2025-04-25 RX ORDER — POLYETHYLENE GLYCOL 3350 17 G/17G
17 POWDER, FOR SOLUTION ORAL DAILY PRN
Status: DISCONTINUED | OUTPATIENT
Start: 2025-04-25 | End: 2025-04-29 | Stop reason: HOSPADM

## 2025-04-25 RX ORDER — ONDANSETRON 4 MG/1
4 TABLET, ORALLY DISINTEGRATING ORAL EVERY 8 HOURS PRN
Status: DISCONTINUED | OUTPATIENT
Start: 2025-04-25 | End: 2025-04-29 | Stop reason: HOSPADM

## 2025-04-25 RX ORDER — AMIODARONE HYDROCHLORIDE 200 MG/1
200 TABLET ORAL 2 TIMES DAILY
Qty: 60 TABLET | Refills: 0 | Status: ON HOLD
Start: 2025-04-25 | End: 2025-05-25

## 2025-04-25 RX ORDER — ONDANSETRON 2 MG/ML
4 INJECTION INTRAMUSCULAR; INTRAVENOUS EVERY 6 HOURS PRN
Status: DISCONTINUED | OUTPATIENT
Start: 2025-04-25 | End: 2025-04-29 | Stop reason: HOSPADM

## 2025-04-25 RX ORDER — FUROSEMIDE 20 MG/1
20 TABLET ORAL DAILY
Status: DISCONTINUED | OUTPATIENT
Start: 2025-04-26 | End: 2025-04-29 | Stop reason: HOSPADM

## 2025-04-25 RX ORDER — POTASSIUM CHLORIDE 1500 MG/1
40 TABLET, EXTENDED RELEASE ORAL PRN
Status: DISCONTINUED | OUTPATIENT
Start: 2025-04-25 | End: 2025-04-29 | Stop reason: HOSPADM

## 2025-04-25 RX ORDER — SODIUM CHLORIDE 9 MG/ML
INJECTION, SOLUTION INTRAVENOUS PRN
Status: DISCONTINUED | OUTPATIENT
Start: 2025-04-25 | End: 2025-04-29 | Stop reason: HOSPADM

## 2025-04-25 RX ORDER — POTASSIUM CHLORIDE 7.45 MG/ML
10 INJECTION INTRAVENOUS PRN
Status: DISCONTINUED | OUTPATIENT
Start: 2025-04-25 | End: 2025-04-29 | Stop reason: HOSPADM

## 2025-04-25 RX ORDER — SODIUM CHLORIDE 0.9 % (FLUSH) 0.9 %
5-40 SYRINGE (ML) INJECTION PRN
Status: DISCONTINUED | OUTPATIENT
Start: 2025-04-25 | End: 2025-04-29 | Stop reason: HOSPADM

## 2025-04-25 RX ORDER — SODIUM CHLORIDE 0.9 % (FLUSH) 0.9 %
5-40 SYRINGE (ML) INJECTION EVERY 12 HOURS SCHEDULED
Status: DISCONTINUED | OUTPATIENT
Start: 2025-04-25 | End: 2025-04-29 | Stop reason: HOSPADM

## 2025-04-25 RX ORDER — METOPROLOL SUCCINATE 50 MG/1
50 TABLET, EXTENDED RELEASE ORAL 2 TIMES DAILY
Qty: 30 TABLET | Refills: 3 | Status: ON HOLD
Start: 2025-04-25

## 2025-04-25 RX ORDER — ALBUTEROL SULFATE 0.83 MG/ML
2.5 SOLUTION RESPIRATORY (INHALATION) EVERY 4 HOURS PRN
Status: DISCONTINUED | OUTPATIENT
Start: 2025-04-25 | End: 2025-04-29 | Stop reason: HOSPADM

## 2025-04-25 RX ORDER — METOPROLOL SUCCINATE 50 MG/1
50 TABLET, EXTENDED RELEASE ORAL 2 TIMES DAILY
Status: DISCONTINUED | OUTPATIENT
Start: 2025-04-25 | End: 2025-04-29 | Stop reason: HOSPADM

## 2025-04-25 RX ORDER — AMIODARONE HYDROCHLORIDE 200 MG/1
200 TABLET ORAL 2 TIMES DAILY
Status: DISCONTINUED | OUTPATIENT
Start: 2025-04-25 | End: 2025-04-25

## 2025-04-25 RX ORDER — MAGNESIUM SULFATE IN WATER 40 MG/ML
2000 INJECTION, SOLUTION INTRAVENOUS PRN
Status: DISCONTINUED | OUTPATIENT
Start: 2025-04-25 | End: 2025-04-29 | Stop reason: HOSPADM

## 2025-04-25 RX ORDER — LEVOTHYROXINE SODIUM 100 UG/1
50 TABLET ORAL DAILY
Status: DISCONTINUED | OUTPATIENT
Start: 2025-04-26 | End: 2025-04-29 | Stop reason: HOSPADM

## 2025-04-25 RX ORDER — PANTOPRAZOLE SODIUM 40 MG/1
40 TABLET, DELAYED RELEASE ORAL DAILY
Status: DISCONTINUED | OUTPATIENT
Start: 2025-04-26 | End: 2025-04-29 | Stop reason: HOSPADM

## 2025-04-25 RX ORDER — FUROSEMIDE 20 MG/1
20 TABLET ORAL DAILY
Qty: 60 TABLET | Refills: 3 | Status: ON HOLD
Start: 2025-04-26

## 2025-04-25 RX ADMIN — METOPROLOL SUCCINATE 50 MG: 50 TABLET, EXTENDED RELEASE ORAL at 20:43

## 2025-04-25 RX ADMIN — METOPROLOL SUCCINATE 50 MG: 50 TABLET, EXTENDED RELEASE ORAL at 09:09

## 2025-04-25 RX ADMIN — LEVOTHYROXINE SODIUM 50 MCG: 0.05 TABLET ORAL at 06:18

## 2025-04-25 RX ADMIN — DIPHENHYDRAMINE HYDROCHLORIDE 25 MG: 50 INJECTION INTRAMUSCULAR; INTRAVENOUS at 20:43

## 2025-04-25 RX ADMIN — FUROSEMIDE 20 MG: 20 TABLET ORAL at 09:08

## 2025-04-25 RX ADMIN — APIXABAN 5 MG: 5 TABLET, FILM COATED ORAL at 09:08

## 2025-04-25 RX ADMIN — AMIODARONE HYDROCHLORIDE 200 MG: 200 TABLET ORAL at 20:43

## 2025-04-25 RX ADMIN — SERTRALINE 100 MG: 100 TABLET, FILM COATED ORAL at 09:08

## 2025-04-25 RX ADMIN — SODIUM CHLORIDE, PRESERVATIVE FREE 10 ML: 5 INJECTION INTRAVENOUS at 20:40

## 2025-04-25 RX ADMIN — AMIODARONE HYDROCHLORIDE 0.5 MG/MIN: 50 INJECTION, SOLUTION INTRAVENOUS at 03:54

## 2025-04-25 RX ADMIN — APIXABAN 5 MG: 5 TABLET, FILM COATED ORAL at 20:43

## 2025-04-25 RX ADMIN — PANTOPRAZOLE SODIUM 40 MG: 40 TABLET, DELAYED RELEASE ORAL at 09:08

## 2025-04-25 RX ADMIN — AMIODARONE HYDROCHLORIDE 200 MG: 200 TABLET ORAL at 09:08

## 2025-04-25 RX ADMIN — SACUBITRIL AND VALSARTAN 1 TABLET: 24; 26 TABLET, FILM COATED ORAL at 09:08

## 2025-04-25 RX ADMIN — AMIODARONE HYDROCHLORIDE 0.5 MG/MIN: 1.8 INJECTION, SOLUTION INTRAVENOUS at 18:17

## 2025-04-25 RX ADMIN — SACUBITRIL AND VALSARTAN 1 TABLET: 24; 26 TABLET, FILM COATED ORAL at 20:43

## 2025-04-25 ASSESSMENT — PAIN SCALES - GENERAL: PAINLEVEL_OUTOF10: 0

## 2025-04-25 NOTE — PLAN OF CARE
Problem: ABCDS Injury Assessment  Goal: Absence of physical injury  4/25/2025 0528 by Radha Cruz RN  Outcome: Progressing     Problem: Discharge Planning  Goal: Discharge to home or other facility with appropriate resources  4/25/2025 0528 by Radha Cruz RN  Outcome: Progressing     Problem: Safety - Adult  Goal: Free from fall injury  4/25/2025 0528 by Radha Cruz RN  Outcome: Progressing     Problem: Chronic Conditions and Co-morbidities  Goal: Patient's chronic conditions and co-morbidity symptoms are monitored and maintained or improved  4/25/2025 0528 by Radha Cruz RN  Outcome: Progressing     Problem: Pain  Goal: Verbalizes/displays adequate comfort level or baseline comfort level  4/25/2025 0528 by Radha Cruz RN  Outcome: Progressing

## 2025-04-25 NOTE — PLAN OF CARE
Problem: ABCDS Injury Assessment  Goal: Absence of physical injury  4/24/2025 2200 by Samina Fish RN  Outcome: Progressing     Problem: Discharge Planning  Goal: Discharge to home or other facility with appropriate resources  4/24/2025 2200 by Samina Fish RN  Outcome: Progressing     Problem: Safety - Adult  Goal: Free from fall injury  4/24/2025 2200 by Samina Fish RN  Outcome: Progressing     Problem: Chronic Conditions and Co-morbidities  Goal: Patient's chronic conditions and co-morbidity symptoms are monitored and maintained or improved  4/24/2025 2200 by Samina Fish RN  Outcome: Progressing     Problem: Pain  Goal: Verbalizes/displays adequate comfort level or baseline comfort level  4/24/2025 2200 by Samina Fish RN  Outcome: Progressing

## 2025-04-25 NOTE — PROGRESS NOTES
[facility]        CARDIOLOGY                 INPATIENT PROGRESS NOTE          PATIENT SEEN IN FOLLOW UP FOR: A fib    Hospital Day: 4     Solomon Adams is a 44 year old patient known to Dr. Duffy from this admission      SUBJECTIVE: Denies any CP or SOB. No distress. Seen in PACU area    ROS: Review of rest of 120 systems negative except as mentioned above    OBJECTIVE: No acute distress.  See Assessment     Diagnostics:       Telemetry: Reviewed         Intake/Output Summary (Last 24 hours) at 4/21/2025 1339  Last data filed at 4/21/2025 0615  Gross per 24 hour   Intake 240 ml   Output 500 ml   Net -260 ml       Labs:   CBC:   Recent Labs     04/20/25  1045 04/21/25  1154   WBC 13.4* 11.7*   HGB 15.0 15.6   HCT 47.1 49.0    235     BMP:   Recent Labs     04/20/25  1045 04/21/25  1154    138   K 4.4 4.5   CO2 21* 20*   BUN 25* 21*   CREATININE 1.4* 1.5*   LABGLOM 64 60*   CALCIUM 8.3* 8.5*     Mag:   Recent Labs     04/19/25  0504   MG 2.1     Phos: No results for input(s): \"PHOS\" in the last 72 hours.  TSH:   No results for input(s): \"TSH\" in the last 72 hours.    HgA1c:     BNP: No results for input(s): \"BNP\" in the last 72 hours.  PT/INR: No results for input(s): \"PROTIME\", \"INR\" in the last 72 hours.  APTT:No results for input(s): \"APTT\" in the last 72 hours.  CARDIAC ENZYMES:  Recent Labs     04/18/25  1410   TROPHS 17     FASTING LIPID PANEL:  Lab Results   Component Value Date/Time    HDL 28 04/19/2025 05:00 AM     LIVER PROFILE:  No results for input(s): \"AST\", \"ALT\", \"LABALBU\" in the last 72 hours.      Current Inpatient Medications:   midazolam        dilTIAZem  120 mg Oral Daily    metoprolol tartrate  50 mg Oral BID    apixaban  5 mg Oral BID    levothyroxine  50 mcg Oral Daily    pantoprazole  40 mg Oral Daily    sertraline  100 mg Oral Daily    sodium chloride flush  5-40 mL IntraVENous 2 times per day    doxycycline hyclate  100 mg Oral 2 times per day    
               [facility]        CARDIOLOGY                 INPATIENT PROGRESS NOTE          PATIENT SEEN IN FOLLOW UP FOR: A fib    Hospital Day: 5     Solomon Adams is a 44 year old patient known to Dr. Duffy from this admission      SUBJECTIVE: Denies any CP or SOB. No distress. Had BELKIS/DCCV yesterday. BELKIS showed severe LV dysfunction. Went in to A fib/RCt today morning, started on iv cardizem.    ROS: Review of rest of 12 systems negative except as mentioned above    OBJECTIVE: No acute distress.  See Assessment     Diagnostics:       Telemetry: Reviewed     BELKIS 4/21/2025    Left Ventricle: Severely reduced left ventricular systolic function with a visually estimated EF of 20 - 25%. Left ventricle size is normal. Normal wall thickness. Severe global hypokinesis present.    Left Atrium: Left atrium is borderline dilated. No left atrial or atrail appendage thrombus noted.    Right Ventricle: Right ventricle size is normal. Reduced systolic function visually.    Tricuspid Valve: Mild regurgitation.    Pericardium: No pericardial effusion.    No prior study.    Intake/Output Summary (Last 24 hours) at 4/22/2025 0853  Last data filed at 4/22/2025 0615  Gross per 24 hour   Intake 270 ml   Output --   Net 270 ml       Labs:   CBC:   Recent Labs     04/21/25  1154 04/22/25  0712   WBC 11.7* 11.5   HGB 15.6 16.0   HCT 49.0 51.6    225     BMP:   Recent Labs     04/21/25  1154 04/22/25  0712    138   K 4.5 4.1   CO2 20* 19*   BUN 21* 19   CREATININE 1.5* 1.4*   LABGLOM 60* 66   CALCIUM 8.5* 8.0*     Mag:   No results for input(s): \"MG\" in the last 72 hours.    Phos: No results for input(s): \"PHOS\" in the last 72 hours.  TSH:   No results for input(s): \"TSH\" in the last 72 hours.    HgA1c:     BNP: No results for input(s): \"BNP\" in the last 72 hours.  PT/INR: No results for input(s): \"PROTIME\", \"INR\" in the last 72 hours.  APTT:No results for input(s): \"APTT\" in the last 72 hours.  CARDIAC ENZYMES:  No 
       [facility] Hospitalist Progress Note    Admitting Date and Time: 4/18/2025  9:55 AM  Admit Dx: New onset atrial fibrillation (HCC) [I48.91]  Atrial fibrillation with rapid ventricular response (HCC) [I48.91]  Atrial fibrillation with RVR (HCC) [I48.91]    Subjective:  Patient is being followed for New onset atrial fibrillation (HCC) [I48.91]  Atrial fibrillation with rapid ventricular response (HCC) [I48.91]  Atrial fibrillation with RVR (HCC) [I48.91]   Pt was seen and examined.     ROS: denies fever, chills, cp, sob, n/v, HA unless stated above.      metoprolol succinate  50 mg Oral BID    amiodarone  200 mg Oral BID    furosemide  20 mg Oral Daily    sacubitril-valsartan  1 tablet Oral BID    apixaban  5 mg Oral BID    levothyroxine  50 mcg Oral Daily    pantoprazole  40 mg Oral Daily    sertraline  100 mg Oral Daily    sodium chloride flush  5-40 mL IntraVENous 2 times per day     diphenhydrAMINE, 25 mg, Nightly PRN  sodium chloride flush, 5-40 mL, PRN  sodium chloride, , PRN  potassium chloride, 40 mEq, PRN   Or  potassium alternative oral replacement, 40 mEq, PRN   Or  potassium chloride, 10 mEq, PRN  magnesium sulfate, 2,000 mg, PRN  ondansetron, 4 mg, Q8H PRN   Or  ondansetron, 4 mg, Q6H PRN  polyethylene glycol, 17 g, Daily PRN  acetaminophen, 650 mg, Q6H PRN   Or  acetaminophen, 650 mg, Q6H PRN  albuterol, 2.5 mg, Q4H PRN  guaiFENesin-dextromethorphan, 5 mL, Q4H PRN         Objective:    /70   Pulse 81   Temp 97.9 °F (36.6 °C) (Oral)   Resp 18   Ht 1.829 m (6')   Wt (!) 142.9 kg (315 lb)   SpO2 97%   BMI 42.72 kg/m²     General Appearance: alert and oriented to person, place and time and in no acute distress  Skin: warm and dry  Head: normocephalic and atraumatic  Eyes: pupils equal, round, and reactive to light, extraocular eye movements intact, conjunctivae normal  Neck: neck supple and non tender without mass   Pulmonary/Chest: clear to auscultation bilaterally- no wheezes, rales 
       [facility] Hospitalist Progress Note    Admitting Date and Time: 4/18/2025  9:55 AM  Admit Dx: New onset atrial fibrillation (HCC) [I48.91]  Atrial fibrillation with rapid ventricular response (HCC) [I48.91]  Atrial fibrillation with RVR (HCC) [I48.91]    Subjective:  Patient is being followed for New onset atrial fibrillation (HCC) [I48.91]  Atrial fibrillation with rapid ventricular response (HCC) [I48.91]  Atrial fibrillation with RVR (HCC) [I48.91]   Patient is seen today. No acute complaints.     ROS: denies fever, chills, cp, sob, n/v, HA unless stated above.      dilTIAZem  120 mg Oral Daily    metoprolol tartrate  50 mg Oral BID    apixaban  5 mg Oral BID    levothyroxine  50 mcg Oral Daily    pantoprazole  40 mg Oral Daily    sertraline  100 mg Oral Daily    sodium chloride flush  5-40 mL IntraVENous 2 times per day    doxycycline hyclate  100 mg Oral 2 times per day    cefTRIAXone (ROCEPHIN) IV  1,000 mg IntraVENous Q24H     diphenhydrAMINE, 25 mg, Nightly PRN  sodium chloride flush, 5-40 mL, PRN  sodium chloride, , PRN  potassium chloride, 40 mEq, PRN   Or  potassium alternative oral replacement, 40 mEq, PRN   Or  potassium chloride, 10 mEq, PRN  magnesium sulfate, 2,000 mg, PRN  ondansetron, 4 mg, Q8H PRN   Or  ondansetron, 4 mg, Q6H PRN  polyethylene glycol, 17 g, Daily PRN  acetaminophen, 650 mg, Q6H PRN   Or  acetaminophen, 650 mg, Q6H PRN  albuterol, 2.5 mg, Q4H PRN  guaiFENesin-dextromethorphan, 5 mL, Q4H PRN         Objective:    /69   Pulse 84   Temp 98.1 °F (36.7 °C) (Oral)   Resp 18   Ht 1.829 m (6')   Wt (!) 145.2 kg (320 lb)   SpO2 92%   BMI 43.40 kg/m²     General Appearance: alert and oriented to person, place and time and in no acute distress  Skin: warm and dry  Head: normocephalic and atraumatic  Eyes: pupils equal, round, and reactive to light, extraocular eye movements intact, conjunctivae normal  Neck: neck supple and non tender without mass   Pulmonary/Chest: 
       [facility] Hospitalist Progress Note    Admitting Date and Time: 4/18/2025  9:55 AM  Admit Dx: New onset atrial fibrillation (HCC) [I48.91]  Atrial fibrillation with rapid ventricular response (HCC) [I48.91]  Atrial fibrillation with RVR (HCC) [I48.91]    Subjective:  Patient is being followed for New onset atrial fibrillation (HCC) [I48.91]  Atrial fibrillation with rapid ventricular response (HCC) [I48.91]  Atrial fibrillation with RVR (HCC) [I48.91]     No acute events overnight.  Patient is still in atrial fibrillation on telemetry with rate controlled and he is on Cardizem drip.    ROS: denies fever, chills, cp, sob, n/v, HA unless stated above.      metoprolol tartrate  25 mg Oral BID    levothyroxine  50 mcg Oral Daily    pantoprazole  40 mg Oral Daily    sertraline  100 mg Oral Daily    sodium chloride flush  5-40 mL IntraVENous 2 times per day    doxycycline hyclate  100 mg Oral 2 times per day    cefTRIAXone (ROCEPHIN) IV  1,000 mg IntraVENous Q24H     sodium chloride flush, 5-40 mL, PRN  sodium chloride, , PRN  potassium chloride, 40 mEq, PRN   Or  potassium alternative oral replacement, 40 mEq, PRN   Or  potassium chloride, 10 mEq, PRN  magnesium sulfate, 2,000 mg, PRN  ondansetron, 4 mg, Q8H PRN   Or  ondansetron, 4 mg, Q6H PRN  polyethylene glycol, 17 g, Daily PRN  acetaminophen, 650 mg, Q6H PRN   Or  acetaminophen, 650 mg, Q6H PRN  albuterol, 2.5 mg, Q4H PRN  guaiFENesin-dextromethorphan, 5 mL, Q4H PRN         Objective:    BP (!) 129/92   Pulse 99   Temp 98.1 °F (36.7 °C) (Oral)   Resp 16   Ht 1.829 m (6')   Wt (!) 138.3 kg (305 lb)   SpO2 97%   BMI 41.37 kg/m²     General Appearance: alert and oriented to person, place and time and in no acute distress  Skin: warm and dry  Head: normocephalic and atraumatic  Eyes: pupils equal, round, and reactive to light, extraocular eye movements intact, conjunctivae normal  Neck: neck supple and non tender without mass   Pulmonary/Chest: clear to 
       [facility] Hospitalist Progress Note    Admitting Date and Time: 4/18/2025  9:55 AM  Admit Dx: New onset atrial fibrillation (HCC) [I48.91]  Atrial fibrillation with rapid ventricular response (HCC) [I48.91]  Atrial fibrillation with RVR (HCC) [I48.91]    Subjective:  Patient is being followed for New onset atrial fibrillation (HCC) [I48.91]  Atrial fibrillation with rapid ventricular response (HCC) [I48.91]  Atrial fibrillation with RVR (HCC) [I48.91]   Patient is seen today. No acute complaints.     ROS: denies fever, chills, cp, sob, n/v, HA unless stated above.      metoprolol succinate  50 mg Oral Daily    sacubitril-valsartan  1 tablet Oral BID    apixaban  5 mg Oral BID    levothyroxine  50 mcg Oral Daily    pantoprazole  40 mg Oral Daily    sertraline  100 mg Oral Daily    sodium chloride flush  5-40 mL IntraVENous 2 times per day    doxycycline hyclate  100 mg Oral 2 times per day    cefTRIAXone (ROCEPHIN) IV  1,000 mg IntraVENous Q24H     diphenhydrAMINE, 25 mg, Nightly PRN  sodium chloride flush, 5-40 mL, PRN  sodium chloride, , PRN  potassium chloride, 40 mEq, PRN   Or  potassium alternative oral replacement, 40 mEq, PRN   Or  potassium chloride, 10 mEq, PRN  magnesium sulfate, 2,000 mg, PRN  ondansetron, 4 mg, Q8H PRN   Or  ondansetron, 4 mg, Q6H PRN  polyethylene glycol, 17 g, Daily PRN  acetaminophen, 650 mg, Q6H PRN   Or  acetaminophen, 650 mg, Q6H PRN  albuterol, 2.5 mg, Q4H PRN  guaiFENesin-dextromethorphan, 5 mL, Q4H PRN         Objective:    BP (!) 117/98   Pulse 96   Temp 98.8 °F (37.1 °C) (Oral)   Resp 18   Ht 1.829 m (6')   Wt (!) 145.2 kg (320 lb)   SpO2 98%   BMI 43.40 kg/m²     General Appearance: alert and oriented to person, place and time and in no acute distress  Skin: warm and dry  Head: normocephalic and atraumatic  Eyes: pupils equal, round, and reactive to light, extraocular eye movements intact, conjunctivae normal  Cardiac: irregularly irregular rhythm, normal 
       [facility] Hospitalist Progress Note    Admitting Date and Time: 4/18/2025  9:55 AM  Admit Dx: New onset atrial fibrillation (HCC) [I48.91]  Atrial fibrillation with rapid ventricular response (HCC) [I48.91]  Atrial fibrillation with RVR (HCC) [I48.91]    Subjective:  Patient is being followed for New onset atrial fibrillation (HCC) [I48.91]  Atrial fibrillation with rapid ventricular response (HCC) [I48.91]  Atrial fibrillation with RVR (HCC) [I48.91]     No acute events overnight.  Patient is still in atrial fibrillation on telemetry with therapy and HR around 130s.  Patient weaned off Cardizem drip and metoprolol increased and Cardizem was added.    ROS: denies fever, chills, cp, sob, n/v, HA unless stated above.      metoprolol tartrate  50 mg Oral BID    apixaban  5 mg Oral BID    levothyroxine  50 mcg Oral Daily    pantoprazole  40 mg Oral Daily    sertraline  100 mg Oral Daily    sodium chloride flush  5-40 mL IntraVENous 2 times per day    doxycycline hyclate  100 mg Oral 2 times per day    cefTRIAXone (ROCEPHIN) IV  1,000 mg IntraVENous Q24H     sodium chloride flush, 5-40 mL, PRN  sodium chloride, , PRN  potassium chloride, 40 mEq, PRN   Or  potassium alternative oral replacement, 40 mEq, PRN   Or  potassium chloride, 10 mEq, PRN  magnesium sulfate, 2,000 mg, PRN  ondansetron, 4 mg, Q8H PRN   Or  ondansetron, 4 mg, Q6H PRN  polyethylene glycol, 17 g, Daily PRN  acetaminophen, 650 mg, Q6H PRN   Or  acetaminophen, 650 mg, Q6H PRN  albuterol, 2.5 mg, Q4H PRN  guaiFENesin-dextromethorphan, 5 mL, Q4H PRN         Objective:    /86   Pulse (!) 106   Temp 98.8 °F (37.1 °C) (Oral)   Resp 18   Ht 1.829 m (6')   Wt (!) 138.3 kg (305 lb)   SpO2 98%   BMI 41.37 kg/m²     General Appearance: alert and oriented to person, place and time and in no acute distress  Skin: warm and dry  Head: normocephalic and atraumatic  Eyes: pupils equal, round, and reactive to light, extraocular eye movements intact, 
       [facility] Hospitalist Progress Note    Admitting Date and Time: 4/18/2025  9:55 AM  Admit Dx: New onset atrial fibrillation (HCC) [I48.91]  Atrial fibrillation with rapid ventricular response (HCC) [I48.91]  Atrial fibrillation with RVR (HCC) [I48.91]    Subjective:  Patient is being followed for New onset atrial fibrillation (HCC) [I48.91]  Atrial fibrillation with rapid ventricular response (HCC) [I48.91]  Atrial fibrillation with RVR (HCC) [I48.91]   Patient is seen today. No acute complaints.     ROS: denies fever, chills, cp, sob, n/v, HA unless stated above.      digoxin  250 mcg IntraVENous Once    [START ON 4/24/2025] metoprolol succinate  50 mg Oral Daily    furosemide  20 mg Oral Daily    sacubitril-valsartan  1 tablet Oral BID    apixaban  5 mg Oral BID    levothyroxine  50 mcg Oral Daily    pantoprazole  40 mg Oral Daily    sertraline  100 mg Oral Daily    sodium chloride flush  5-40 mL IntraVENous 2 times per day    cefTRIAXone (ROCEPHIN) IV  1,000 mg IntraVENous Q24H     diphenhydrAMINE, 25 mg, Nightly PRN  sodium chloride flush, 5-40 mL, PRN  sodium chloride, , PRN  potassium chloride, 40 mEq, PRN   Or  potassium alternative oral replacement, 40 mEq, PRN   Or  potassium chloride, 10 mEq, PRN  magnesium sulfate, 2,000 mg, PRN  ondansetron, 4 mg, Q8H PRN   Or  ondansetron, 4 mg, Q6H PRN  polyethylene glycol, 17 g, Daily PRN  acetaminophen, 650 mg, Q6H PRN   Or  acetaminophen, 650 mg, Q6H PRN  albuterol, 2.5 mg, Q4H PRN  guaiFENesin-dextromethorphan, 5 mL, Q4H PRN         Objective:    BP (!) 137/93   Pulse 51   Temp 97.9 °F (36.6 °C) (Oral)   Resp 18   Ht 1.829 m (6')   Wt (!) 142.9 kg (315 lb)   SpO2 95%   BMI 42.72 kg/m²     General Appearance: alert and oriented to person, place and time and in no acute distress  Skin: warm and dry  Head: normocephalic and atraumatic  Eyes: pupils equal, round, and reactive to light, extraocular eye movements intact, conjunctivae normal  Cardiac: 
  4 Eyes Skin Assessment     NAME:  Solomon Adams  YOB: 1980  MEDICAL RECORD NUMBER:  [id number]    The patient is being assessed for  Admission    I agree that at least one RN has performed a thorough Head to Toe Skin Assessment on the patient. ALL assessment sites listed below have been assessed.      Areas assessed by both nurses:    Head, Face, Ears, Shoulders, Back, Chest, Arms, Elbows, Hands, Sacrum. Buttock, Coccyx, Ischium, Legs. Feet and Heels, and Under Medical Devices         Does the Patient have a Wound? No noted wound(s)       George Prevention initiated by RN: No  Wound Care Orders initiated by RN: No    Pressure Injury (Stage 3,4, Unstageable, DTI, NWPT, and Complex wounds) if present, place Wound referral order by RN under : No    New Ostomies, if present place, Ostomy referral order under : No     Nurse 1 eSignature: Electronically signed by Kelly Frias RN on 4/18/25 at 6:40 PM EDT    **SHARE this note so that the co-signing nurse can place an eSignature**    Nurse 2 eSignature: Electronically signed by Freya Pepe RN on 4/18/25 at 6:41 PM EDT   
  Radiology Procedure Waiver   Name: Solomon Adams  : 1980  MRN: [id number]    Date:  25    Time: 11:15 AM EDT    Benefits of immediately proceeding with Radiology exam(s) without pre-testing outweigh the risks or are not indicated as specified below and therefore the following is/are being waived:    [] Pregnancy test   [] Patients LMP on-time and regular.   [] Patient had Tubal Ligation or has other Contraception Device.   [] Patient  is Menopausal or Premenarcheal.    [] Patient had Full or Partial Hysterectomy.    [x] Protocol for Iodine allergy    [] MRI Questionnaire     [] BUN/Creatinine   [] Patient age w/no hx of renal dysfunction.   [] Patient on Dialysis.   [] Recent Normal Labs.  Electronically signed by Chaparro Bui DO on 25 at 11:15 AM EDT           
At 720am attempted to do echo but pt heart incrased to 120-130 unable to do at this time Will try later NLowery RCS  
Cardiology consult sent via perfect serve.   
Clinical manager Jen GARZA notified pt ordered BELKIS/DCCV tomorrow with [facility] cardiology.  
Comprehensive Nutrition Assessment    Type and Reason for Visit:  Initial, LOS    Nutrition Recommendations/Plan:   Continue Cardiac diet, intake is good     Malnutrition Assessment:  Malnutrition Status:   no indicators of malnutrition    Nutrition History and Allergies:      Past Medical History:   Diagnosis Date    Atrial fibrillation with RVR (HCC)     Cardiomyopathy (HCC)     Decreased cardiac ejection fraction     GERD (gastroesophageal reflux disease)     History of cardioversion     History of cardioversion     Hypothyroid     Pneumonia     S/P transesophageal echocardiogram (BELKIS)      Allergies   Allergen Reactions    Iodinated Contrast Media Anaphylaxis, Shortness Of Breath and Itching     Wt Readings from Last 10 Encounters:   04/25/25 (!) 142.9 kg (315 lb)         Nutrition Assessment:    Pt on Heart Healthy diet. Admittedd for new onset Afib. Cardioversion unsuccessful. On amio drip.    Nutrition Related Findings:    +1 edema. LBM 4/23 Wound Type: None       Current Facility-Administered Medications   Medication Dose Route Frequency Provider Last Rate Last Admin    amiodarone (CORDARONE) 450 mg in dextrose 5% 250 mL infusion  0.5 mg/min IntraVENous Continuous Nayely Shelley MD 16.7 mL/hr at 04/25/25 0354 0.5 mg/min at 04/25/25 0354    metoprolol succinate (TOPROL XL) extended release tablet 50 mg  50 mg Oral BID Nayely Shelley MD   50 mg at 04/25/25 0909    amiodarone (CORDARONE) tablet 200 mg  200 mg Oral BID Juju Tracey APN   200 mg at 04/25/25 0908    furosemide (LASIX) tablet 20 mg  20 mg Oral Daily Nayely Shelley MD   20 mg at 04/25/25 0908    sacubitril-valsartan (ENTRESTO) 24-26 MG per tablet 1 tablet  1 tablet Oral BID Nayely Shelley MD   1 tablet at 04/25/25 0908    diphenhydrAMINE (BENADRYL) injection 25 mg  25 mg IntraVENous Nightly PRN Negar Patten MD   25 mg at 04/23/25 2025    apixaban (ELIQUIS) tablet 5 mg  5 mg Oral BID Luciana Petersen, APRN - CNP   5 
Had DCCV x3 today, back in A fib with RVR -  Will start Amiodarone drip and need EP consult, hence, transfer to tertiary care facility  Above d/w him and his wife  Agreeable to transfer to [facility] for EP consult when bed available.  They will look in to [facility] if there is EP consultants avialable.    Primary-Dr Damien Medel updated     Nayely Shelley MD  4/24/2025  3:35 PM      Case briefly d/w Rubi EP - Dr De La Torre at [facility].    Electronically signed by Nayely Shelley MD on 4/24/2025 at 4:27 PM      Addendum:  Nursing staff informed that family wanted to transfer him to [facility].    I asked them to update update Primary, Dr Medel make arrangements to transfer to [facility]    Electronically signed by Nayely Shelley MD on 4/24/2025 at 5:15 PM      
Messaged Dr Patten regarding if pt could get something to help him sleep tonight   
Messaged sent to Cardiology regarding pts having a discharge order in.  
Notified Teresita Carmona NP, of patient's preprocedure Vs.  
Nurse to nurse attempted, RN was on hold for 20 minutes and no one answered. Will try again at a later time.   
Nurse to nurse given to Theresa SUMNER at [facility]. All questions answered at this time.   
Persistent AF RVR  Amiodarone bolus (aware of iodine allergy, spoke with pharmacy)  Monitor for any side effects from Amiodarone  Amiodarone PO BID to start this evening  Discontinue Cardizem drip  Discontinue PO digoxin  Plan discussed with charge RN  Will monitor overnight, possible discharge 4/24/2025  Electronically signed by PAYAL PENA on 4/23/2025 at 12:41 PM    Addendum:    Vitals:    04/23/25 1530   BP: 97/80   Pulse: 52   Resp: 16   Temp: 98.6 °F (37 °C)   SpO2: 96%       If persistent tachycardia, consider DC cardioversion tomorrow.  Keep him NPO after mid night     Electronically signed by Nayely Shelley MD on 4/23/2025 at 3:56 PM    
Pt ambulated in hallway, HR 150s-150s. Dr Duffy notified.   
Spoke with Dr. Oshea regarding patient back in Afib RVR, new orders received.   
This RN called and left message with cardiology office regarding them needing to schedule cardioversion.  
d/w Primary Dr Medel earlier today  Monitor showed A Fib, rate 110-118  Awaiting bed at [facility]    He would like to follow up with Cardiologist/EP in [facility], closer to home.  If he wanted to f/u with [facility], he will follow up with Dr Duffy.    Nayely Shelley MD  4/25/2025  1:58 PM  
СЕРГЕЙ notified of [employer] order.  
\"TROPHS\" in the last 72 hours.    FASTING LIPID PANEL:  Lab Results   Component Value Date/Time    HDL 28 2025 05:00 AM     LIVER PROFILE:  No results for input(s): \"AST\", \"ALT\", \"LABALBU\" in the last 72 hours.      Current Inpatient Medications:   digoxin  250 mcg IntraVENous Once    [START ON 2025] metoprolol succinate  50 mg Oral Daily    furosemide  20 mg Oral Daily    sacubitril-valsartan  1 tablet Oral BID    apixaban  5 mg Oral BID    levothyroxine  50 mcg Oral Daily    pantoprazole  40 mg Oral Daily    sertraline  100 mg Oral Daily    sodium chloride flush  5-40 mL IntraVENous 2 times per day    cefTRIAXone (ROCEPHIN) IV  1,000 mg IntraVENous Q24H       IV Infusions (if any):   dilTIAZem 7.5 mg/hr (25 0515)    sodium chloride           PHYSICAL EXAM:     CONSTITUTIONAL:   BP (!) 137/93   Pulse currently 104-116   Temp 97.9 °F (36.6 °C) (Oral)   Resp 18   Ht 1.829 m (6')   Wt (!) 142.9 kg (315 lb)   SpO2 95%   BMI 42.72 kg/m²   Pulse  Av.1  Min: 51  Max: 134  Systolic (24hrs), Av , Min:106 , Max:137    Diastolic (24hrs), Av, Min:76, Max:103      CONST:  Awake, alert and no apparent distress.   HEENT: Eyes - No icterus  Throat - Pharynx - Clear  Neck-  No stridor, no jugular venous distention.  CHEST: No accessory muscle use  RESPIRATORY: Lung sounds - Few rhonchi, diminished at bases  CARDIOVASCULAR: Irregularly irregular rate, 2/6 systolic murmur.  EXT: + lower extremity edema. Distal pulses fair   ABDOMEN: Bowel sounds present. Obese   - Deferred  SKIN: Warm and dry   NEURO: Oriented to person, place           ASSESSMENT/PLAN:     Atrial fibrillation with RVR (HCC) - NEW - Noted  on EKG 2025, duration unknown). [id number] score 1 (CHF).  TSH 4.38, normal free T4. s/p BELKIS/DCCVx2 25 - Went back to A Fib 25 - Rate control with metoprolol - Increase Metoprolol and add Digoxin. Wean iv Cardizem. Monitor lytes- keel K+ >4.0 and Mg++ > 2.0. Continue OAC with 
in the last 72 hours.    FASTING LIPID PANEL:  Lab Results   Component Value Date/Time    HDL 28 2025 05:00 AM     LIVER PROFILE:  No results for input(s): \"AST\", \"ALT\", \"LABALBU\" in the last 72 hours.      Current Inpatient Medications:   metoprolol succinate  50 mg Oral BID    amiodarone  200 mg Oral BID    furosemide  20 mg Oral Daily    sacubitril-valsartan  1 tablet Oral BID    apixaban  5 mg Oral BID    levothyroxine  50 mcg Oral Daily    pantoprazole  40 mg Oral Daily    sertraline  100 mg Oral Daily    sodium chloride flush  5-40 mL IntraVENous 2 times per day       IV Infusions (if any):   sodium chloride           PHYSICAL EXAM:     CONSTITUTIONAL:   BP (!) 137/93   Pulse currently 118-140s  Temp 97.9 °F (36.6 °C) (Oral)   Resp 18   Ht 1.829 m (6')   Wt (!) 142.9 kg (315 lb)   SpO2 95%   BMI 42.72 kg/m²   Pulse  Av.8  Min: 52  Max: 106  Systolic (24hrs), Av , Min:97 , Max:129    Diastolic (24hrs), Av, Min:75, Max:93      CONST:  Awake, alert and no apparent distress.   HEENT: Eyes - No icterus  Throat - Pharynx - Clear  Neck-  No stridor, no jugular venous distention.  CHEST: No accessory muscle use  RESPIRATORY: Lung sounds - Few rhonchi, diminished at bases  CARDIOVASCULAR: Irregularly irregular rate, 2/6 systolic murmur.  EXT: + lower extremity edema. Distal pulses fair   ABDOMEN: Bowel sounds present. Obese   - Deferred  SKIN: Warm and dry   NEURO: Oriented to person, place           ASSESSMENT/PLAN:     Atrial fibrillation with RVR (HCC) - NEW - Noted  on EKG 2025, duration unknown). [id number]-[id number] score 1 (CHF).  TSH 4.38, normal free T4. s/p BELKIS/DCCVx2 25 - Went back to A Fib 25 - Rate control with Metoprolol. Off Cardizem and Digoxin and started on Amiodarone yesterday-tolerating  (hx of Iodine allergy).  Monitor lytes Potasium and Magnesium. Continue OAC with Eliquis.  Out-pt EP referral. Event monitor upon discharge to assess A Fib burden. Due to 
murmurs  Abdomen: Soft, nontender, +bowel sounds  Extremities: Moves all extremities x 4, no lower extremity edema  Neurologic: No focal motor deficits apparent, normal mood and affect  Peripheral Pulses: Intact posterior tibial pulses bilaterally    Intake/Output:  No intake or output data in the 24 hours ending 04/20/25 0949  No intake/output data recorded.    Laboratory Tests:  Recent Labs     04/18/25  1030 04/19/25  0500    139   K 3.9 4.6    107   CO2 20* 18*   BUN 31* 23*   CREATININE 1.8* 1.5*   GLUCOSE 102* 120*   CALCIUM 8.8 8.4*     Lab Results   Component Value Date/Time    MG 2.1 04/19/2025 05:04 AM     Recent Labs     04/18/25  1030   ALKPHOS 63   ALT 88*   AST 73*   BILITOT 2.3*     Recent Labs     04/18/25  1030 04/19/25  0500   WBC 13.7* 9.2   RBC 5.19 4.99   HGB 15.1 14.7   HCT 46.0 44.9   MCV 88.6 90.0   MCH 29.1 29.5   MCHC 32.8 32.7   RDW 14.1 14.6    219   MPV 10.4 10.6     No results found for: \"CKTOTAL\", \"CKMB\", \"CKMBINDEX\", \"TROPONINI\"  No results found for: \"INR\", \"PROTIME\"  Lab Results   Component Value Date    TSH 4.38 (H) 04/18/2025     No results found for: \"LABA1C\"  No results found for: \"EAG\"  No results found for: \"CHOL\"  No results found for: \"TRIG\"  Lab Results   Component Value Date    HDL 28 (L) 04/19/2025     No components found for: \"LDLCALC\", \"LDLCHOLESTEROL\"  Lab Results   Component Value Date    VLDL 19 04/19/2025     No results found for: \"CHOLHDLRATIO\"  Recent Labs     04/18/25  1030   PROBNP 1,976*       Cardiac Tests:    ASSESSMENT:  New onset of AF with RVR (captured on EKG 4/18/2025, duration unknown): [id number] score 0.  TSH 4.38.  Normal free T4. Currently on Cardizem IV gtt 12.5 mg/hr.   RICHIE: SCr 1.8>>1.5. Improving with IVF  Pneumonia, on antibiotics   Hypothyroidism, replacement therapy. TSH 4.38. Normal Free T4  GERD, on Protonix.  ADHD, on Adderall  Anxiety/depression  Super morbid obese: BMI 41.37 kg/m2  Iodine allergy:

## 2025-04-25 NOTE — H&P
Inpatient H&P      PCP:  No primary care provider on file.  Admitting Physician:  Boyd Rowe MD  Consultants:  EP  Chief Complaint:  No chief complaint on file.      History of Present Illness  Solomon Adams is a 44 y.o. male who presents to [facility] ER complaining of SOB and palpitations.    Solomon Adams has a past medical history that history hypothyroidism    Solomon initially presented to [facility] with shortness of breath.  Shortness of breath has been worsening over the past few days prior to presentation to ER.  He had seen his primary care physician and was prescribed treatment for URI.  In the ER, he was found to be in A-fib with RVR.  He was admitted to [place] and cardiology was consulted.  He had failed cardioversions at [place] and it was determined that patient should be transferred to [facility] for EP evaluation.      Last Echocardiogram - I personally re    Left Ventricle: Severely reduced left ventricular systolic function with a visually estimated EF of 20 - 25%. Left ventricle size is normal. Normal wall thickness. Severe global hypokinesis present.    Left Atrium: Left atrium is borderline dilated. No left atrial or atrail appendage thrombus noted.    Right Ventricle: Right ventricle size is normal. Reduced systolic function visually.    Tricuspid Valve: Mild regurgitation.    Pericardium: No pericardial effusion.    No prior study.     ED TRIAGE VITALS  BP: (!) 143/98, Temp: 97.2 °F (36.2 °C), Pulse: (!) 127, Respirations: 18, SpO2: 96 %    Vitals:    04/25/25 1633   BP: (!) 143/98   Pulse: (!) 127   Resp: 18   Temp: 97.2 °F (36.2 °C)   TempSrc: Temporal   SpO2: 96%   Weight: (!) 142.9 kg (315 lb)   Height: 1.829 m (6')         Histories  Past Medical History:   Diagnosis Date    Atrial fibrillation with RVR (HCC)     Cardiomyopathy (HCC)     Decreased cardiac ejection fraction     GERD (gastroesophageal reflux disease)     History of cardioversion     History of cardioversion

## 2025-04-25 NOTE — DISCHARGE SUMMARY
[facility] Hospitalist Physician Discharge Summary       No follow-up provider specified.    Activity level: As tolerated     Dispo: Send this may also    Condition on discharge: Stable     Patient ID:  Solomon Adams  [id number]  44 y.o.  1980    Admit date: 4/18/2025    Discharge date and time:  4/25/2025  12:30 PM    Admission Diagnoses: Principal Problem:    New onset atrial fibrillation (HCC)  Active Problems:    Acute renal failure    Acute on chronic heart failure with preserved ejection fraction (HCC)    Morbid obesity (HCC)    History of anabolic steroid use  Resolved Problems:    * No resolved hospital problems. *      Discharge Diagnoses: Principal Problem:    New onset atrial fibrillation (HCC)  Active Problems:    Acute renal failure    Acute on chronic heart failure with preserved ejection fraction (HCC)    Morbid obesity (HCC)    History of anabolic steroid use  Resolved Problems:    * No resolved hospital problems. *      Consults:  IP CONSULT TO HOSPITALIST  IP CONSULT TO CARDIOLOGY  IP CONSULT TO HEART FAILURE NURSE/COORDINATOR    Procedures:     Hospital Course:   44-year-old male admitted for A-fib with RVR he failed direct-current cardioversion 3 times subsequently developed worsening palpitations require EP lab study except to [facility] Hospitalist service medically stable for transfer to the    Discharge Exam:    General Appearance: alert and oriented to person, place and time and in no acute distress  Skin: warm and dry  Head: normocephalic and atraumatic  Eyes: pupils equal, round, and reactive to light, extraocular eye movements intact, conjunctivae normal  Neck: neck supple and non tender without mass   Pulmonary/Chest: clear to auscultation bilaterally- no wheezes, rales or rhonchi, normal air movement, no respiratory distress  Cardiovascular: normal rate, normal S1 and S2 and no carotid bruits  Abdomen: soft, non-tender, non-distended, normal bowel sounds, no masses or

## 2025-04-25 NOTE — CARE COORDINATION
CARE MANAGEMENT.... Patient had another unsuccessful cardioversion yesterday. On iv amio gtt. Transfer process initiated yesterday for transfer to [facility]. Needs EP eval/treat. Per request patient was provided info on disability. Ambulance forms in chart. Will follow.

## 2025-04-26 PROBLEM — I42.9 CARDIOMYOPATHY (HCC): Status: ACTIVE | Noted: 2025-04-26

## 2025-04-26 LAB
ANION GAP SERPL CALCULATED.3IONS-SCNC: 12 MMOL/L (ref 7–16)
BASOPHILS # BLD: 0.05 K/UL (ref 0–0.2)
BASOPHILS NFR BLD: 1 % (ref 0–2)
BUN SERPL-MCNC: 14 MG/DL (ref 6–20)
CALCIUM SERPL-MCNC: 8.4 MG/DL (ref 8.6–10)
CHLORIDE SERPL-SCNC: 106 MMOL/L (ref 98–107)
CO2 SERPL-SCNC: 19 MMOL/L (ref 22–29)
CREAT SERPL-MCNC: 1.4 MG/DL (ref 0.7–1.2)
EOSINOPHIL # BLD: 0.17 K/UL (ref 0.05–0.5)
EOSINOPHILS RELATIVE PERCENT: 2 % (ref 0–6)
ERYTHROCYTE [DISTWIDTH] IN BLOOD BY AUTOMATED COUNT: 14.4 % (ref 11.5–15)
GFR, ESTIMATED: 64 ML/MIN/1.73M2
GLUCOSE SERPL-MCNC: 88 MG/DL (ref 74–99)
HCT VFR BLD AUTO: 52.2 % (ref 37–54)
HGB BLD-MCNC: 17.1 G/DL (ref 12.5–16.5)
IMM GRANULOCYTES # BLD AUTO: 0.04 K/UL (ref 0–0.58)
IMM GRANULOCYTES NFR BLD: 0 % (ref 0–5)
LEFT VENTRICULAR EJECTION FRACTION HIGH VALUE: 30 %
LEFT VENTRICULAR EJECTION FRACTION MODE: NORMAL
LV EF: 25 %
LYMPHOCYTES NFR BLD: 2.49 K/UL (ref 1.5–4)
LYMPHOCYTES RELATIVE PERCENT: 23 % (ref 20–42)
MCH RBC QN AUTO: 29 PG (ref 26–35)
MCHC RBC AUTO-ENTMCNC: 32.8 G/DL (ref 32–34.5)
MCV RBC AUTO: 88.6 FL (ref 80–99.9)
MONOCYTES NFR BLD: 0.81 K/UL (ref 0.1–0.95)
MONOCYTES NFR BLD: 7 % (ref 2–12)
NEUTROPHILS NFR BLD: 68 % (ref 43–80)
NEUTS SEG NFR BLD: 7.49 K/UL (ref 1.8–7.3)
PLATELET # BLD AUTO: 245 K/UL (ref 130–450)
PMV BLD AUTO: 10.2 FL (ref 7–12)
POTASSIUM SERPL-SCNC: 4.4 MMOL/L (ref 3.5–5.1)
RBC # BLD AUTO: 5.89 M/UL (ref 3.8–5.8)
SODIUM SERPL-SCNC: 137 MMOL/L (ref 136–145)
WBC OTHER # BLD: 11.1 K/UL (ref 4.5–11.5)

## 2025-04-26 PROCEDURE — APPSS60 APP SPLIT SHARED TIME 46-60 MINUTES

## 2025-04-26 PROCEDURE — 99232 SBSQ HOSP IP/OBS MODERATE 35: CPT | Performed by: INTERNAL MEDICINE

## 2025-04-26 PROCEDURE — 36415 COLL VENOUS BLD VENIPUNCTURE: CPT

## 2025-04-26 PROCEDURE — 85025 COMPLETE CBC W/AUTO DIFF WBC: CPT

## 2025-04-26 PROCEDURE — 80048 BASIC METABOLIC PNL TOTAL CA: CPT

## 2025-04-26 PROCEDURE — 6360000002 HC RX W HCPCS: Performed by: INTERNAL MEDICINE

## 2025-04-26 PROCEDURE — 2500000003 HC RX 250 WO HCPCS: Performed by: INTERNAL MEDICINE

## 2025-04-26 PROCEDURE — 6370000000 HC RX 637 (ALT 250 FOR IP): Performed by: INTERNAL MEDICINE

## 2025-04-26 PROCEDURE — 2140000000 HC CCU INTERMEDIATE R&B

## 2025-04-26 RX ADMIN — LEVOTHYROXINE SODIUM 50 MCG: 0.1 TABLET ORAL at 05:22

## 2025-04-26 RX ADMIN — APIXABAN 5 MG: 5 TABLET, FILM COATED ORAL at 09:16

## 2025-04-26 RX ADMIN — PANTOPRAZOLE SODIUM 40 MG: 40 TABLET, DELAYED RELEASE ORAL at 09:16

## 2025-04-26 RX ADMIN — AMIODARONE HYDROCHLORIDE 0.5 MG/MIN: 1.8 INJECTION, SOLUTION INTRAVENOUS at 05:23

## 2025-04-26 RX ADMIN — APIXABAN 5 MG: 5 TABLET, FILM COATED ORAL at 20:51

## 2025-04-26 RX ADMIN — SACUBITRIL AND VALSARTAN 1 TABLET: 24; 26 TABLET, FILM COATED ORAL at 20:51

## 2025-04-26 RX ADMIN — FUROSEMIDE 20 MG: 20 TABLET ORAL at 09:16

## 2025-04-26 RX ADMIN — AMIODARONE HYDROCHLORIDE 200 MG: 200 TABLET ORAL at 20:51

## 2025-04-26 RX ADMIN — DIPHENHYDRAMINE HYDROCHLORIDE 25 MG: 50 INJECTION INTRAMUSCULAR; INTRAVENOUS at 20:51

## 2025-04-26 RX ADMIN — AMIODARONE HYDROCHLORIDE 0.5 MG/MIN: 1.8 INJECTION, SOLUTION INTRAVENOUS at 17:28

## 2025-04-26 RX ADMIN — SACUBITRIL AND VALSARTAN 1 TABLET: 24; 26 TABLET, FILM COATED ORAL at 09:16

## 2025-04-26 RX ADMIN — METOPROLOL SUCCINATE 50 MG: 50 TABLET, EXTENDED RELEASE ORAL at 09:16

## 2025-04-26 RX ADMIN — SODIUM CHLORIDE, PRESERVATIVE FREE 10 ML: 5 INJECTION INTRAVENOUS at 20:51

## 2025-04-26 RX ADMIN — SERTRALINE 100 MG: 100 TABLET, FILM COATED ORAL at 09:16

## 2025-04-26 RX ADMIN — AMIODARONE HYDROCHLORIDE 200 MG: 200 TABLET ORAL at 09:16

## 2025-04-26 RX ADMIN — METOPROLOL SUCCINATE 50 MG: 50 TABLET, EXTENDED RELEASE ORAL at 20:51

## 2025-04-26 ASSESSMENT — PAIN SCALES - GENERAL
PAINLEVEL_OUTOF10: 0
PAINLEVEL_OUTOF10: 0

## 2025-04-26 NOTE — CONSULTS
Please see cardiology consult dated 4/19/2025.   Electronically signed by PAYAL PENA on 4/26/2025 at 6:33 AM

## 2025-04-26 NOTE — CONSULTS
[facility]- [facility]  Consultation Report  PATIENT: Solomon Adams  MEDICAL RECORD NUMBER: [id number]  DATE OF SERVICE:  4/26/2025  ATTENDING ELECTROPHYSIOLOGIST: Stella De La Torre MD  PRIMARY ELECTROPHYSIOLOGIST: Stella De La Torre MD  REFERRING PHYSICIAN: Dmaien Medel DO and No primary care provider on file.  CHIEF COMPLAINT: Fatigue, dyspnea, syncope    HPI: This is a 44 y.o. male with a history of obesity, hypothyroidism, on testosterone therapy as well as anabolic steroids in the past who presented to the hospital in [place] with complaints of increasing shortness of breath.  The patient has been noticing symptoms of fatigue and dyspnea for several weeks although in the last 2 weeks his symptoms have worsened.  He also developed high fever and possibly syncope once or twice.  He says he had a high fever and collapsed in his kitchen.  No prior cardiac history or history of syncope  Not known to [facility] cardiology or electrophysiology.  His home medications include Adderall 15 mg daily, Synthroid 50 mcg daily, Protonix, Zoloft  He was initially seen by cardiology and placed on Lopressor for rate control.  He was also on IV Cardizem initially and subsequently underwent BELKIS guided cardioversion on 4/21/2025.  Although initially successful atrial fibrillation with rapid ventricular rate recurred.  He was placed on amiodarone and subsequently cardioversion was reattempted on the 24th unsuccessfully.  He was therefore transferred to the main [facility] in [place] for EP evaluation.  He is therefore on Eliquis 5 twice daily, amiodarone 200 twice daily, metoprolol succinate 50 twice daily, Synthroid 50 and 50 mcg daily, Entresto 24/26 twice a day and Zoloft    Cardiac electrophysiology service is consulted for AF with RVR, LV systolic dysfunction.    Patient Active Problem List    Diagnosis Date Noted    Cardiomyopathy (HCC) 04/26/2025    Persistent atrial fibrillation (HCC)

## 2025-04-27 ENCOUNTER — APPOINTMENT (OUTPATIENT)
Age: 45
DRG: 286 | End: 2025-04-27
Attending: INTERNAL MEDICINE
Payer: COMMERCIAL

## 2025-04-27 PROBLEM — I50.41 ACUTE COMBINED SYSTOLIC AND DIASTOLIC CONGESTIVE HEART FAILURE (HCC): Status: ACTIVE | Noted: 2025-04-27

## 2025-04-27 LAB
ANION GAP SERPL CALCULATED.3IONS-SCNC: 11 MMOL/L (ref 7–16)
BASOPHILS # BLD: 0.05 K/UL (ref 0–0.2)
BASOPHILS NFR BLD: 0 % (ref 0–2)
BNP SERPL-MCNC: 1055 PG/ML (ref 0–125)
BUN SERPL-MCNC: 12 MG/DL (ref 6–20)
CALCIUM SERPL-MCNC: 8.5 MG/DL (ref 8.6–10)
CHLORIDE SERPL-SCNC: 105 MMOL/L (ref 98–107)
CO2 SERPL-SCNC: 20 MMOL/L (ref 22–29)
CREAT SERPL-MCNC: 1.4 MG/DL (ref 0.7–1.2)
ECHO BSA: 2.62 M2
ECHO EST RA PRESSURE: 8 MMHG
ECHO IVC PROX: 2 CM
ECHO LA DIAMETER INDEX: 1.54 CM/M2
ECHO LA DIAMETER: 3.9 CM
ECHO LA VOL A-L A2C: 78 ML (ref 18–58)
ECHO LA VOL A-L A4C: 104 ML (ref 18–58)
ECHO LA VOL BP: 89 ML (ref 18–58)
ECHO LA VOL MOD A2C: 75 ML (ref 18–58)
ECHO LA VOL MOD A4C: 101 ML (ref 18–58)
ECHO LA VOL/BSA BIPLANE: 35 ML/M2 (ref 16–34)
ECHO LA VOLUME AREA LENGTH: 93 ML
ECHO LA VOLUME INDEX A-L A2C: 31 ML/M2 (ref 16–34)
ECHO LA VOLUME INDEX A-L A4C: 41 ML/M2 (ref 16–34)
ECHO LA VOLUME INDEX AREA LENGTH: 37 ML/M2 (ref 16–34)
ECHO LA VOLUME INDEX MOD A2C: 30 ML/M2 (ref 16–34)
ECHO LA VOLUME INDEX MOD A4C: 40 ML/M2 (ref 16–34)
ECHO LV E' LATERAL VELOCITY: 0.11 CM/S
ECHO LV E' SEPTAL VELOCITY: 0.06 CM/S
ECHO LV EDV A2C: 110 ML
ECHO LV EDV A4C: 124 ML
ECHO LV EDV BP: 121 ML (ref 67–155)
ECHO LV EDV INDEX A4C: 49 ML/M2
ECHO LV EDV INDEX BP: 48 ML/M2
ECHO LV EDV NDEX A2C: 43 ML/M2
ECHO LV EF PHYSICIAN: 30 %
ECHO LV EJECTION FRACTION A2C: 35 %
ECHO LV EJECTION FRACTION A4C: 29 %
ECHO LV ESV A2C: 72 ML
ECHO LV ESV A4C: 88 ML
ECHO LV ESV BP: 81 ML (ref 22–58)
ECHO LV ESV INDEX A2C: 28 ML/M2
ECHO LV ESV INDEX A4C: 35 ML/M2
ECHO LV ESV INDEX BP: 32 ML/M2
ECHO LV FRACTIONAL SHORTENING: 13 % (ref 28–44)
ECHO LV INTERNAL DIMENSION DIASTOLE INDEX: 1.9 CM/M2
ECHO LV INTERNAL DIMENSION DIASTOLIC: 4.8 CM (ref 4.2–5.9)
ECHO LV INTERNAL DIMENSION SYSTOLIC INDEX: 1.66 CM/M2
ECHO LV INTERNAL DIMENSION SYSTOLIC: 4.2 CM
ECHO LV IVSD: 1.1 CM (ref 0.6–1)
ECHO LV IVSS: 1.5 CM
ECHO LV MASS 2D: 206.4 G (ref 88–224)
ECHO LV MASS INDEX 2D: 81.6 G/M2 (ref 49–115)
ECHO LV POSTERIOR WALL DIASTOLIC: 1.2 CM (ref 0.6–1)
ECHO LV POSTERIOR WALL SYSTOLIC: 1.5 CM
ECHO LV RELATIVE WALL THICKNESS RATIO: 0.5
ECHO MV "A" WAVE DURATION: 110.4 MSEC
ECHO MV A VELOCITY: 0.24 M/S
ECHO MV E DECELERATION TIME (DT): 176.1 MS
ECHO MV E VELOCITY: 0.55 M/S
ECHO MV E/A RATIO: 2.29
ECHO MV E/E' LATERAL: 500
ECHO MV E/E' RATIO (AVERAGED): 708.33
ECHO MV E/E' SEPTAL: 916.67
ECHO PVEIN A DURATION: 114.2 MS
ECHO PVEIN A VELOCITY: 0.1 M/S
ECHO PVEIN PEAK D VELOCITY: 0.7 M/S
ECHO PVEIN PEAK S VELOCITY: 0.2 M/S
ECHO PVEIN S/D RATIO: 0.3
ECHO RIGHT VENTRICULAR SYSTOLIC PRESSURE (RVSP): 39 MMHG
ECHO RV BASAL DIMENSION: 4.6 CM
ECHO RV INTERNAL DIMENSION: 3.9 CM
ECHO RV MID DIMENSION: 2.7 CM
ECHO RV TAPSE: 1.9 CM (ref 1.7–?)
ECHO TV REGURGITANT MAX VELOCITY: 2.77 M/S
ECHO TV REGURGITANT PEAK GRADIENT: 31 MMHG
EOSINOPHIL # BLD: 0.15 K/UL (ref 0.05–0.5)
EOSINOPHILS RELATIVE PERCENT: 1 % (ref 0–6)
ERYTHROCYTE [DISTWIDTH] IN BLOOD BY AUTOMATED COUNT: 14.2 % (ref 11.5–15)
GFR, ESTIMATED: 61 ML/MIN/1.73M2
GLUCOSE SERPL-MCNC: 97 MG/DL (ref 74–99)
HCT VFR BLD AUTO: 54.1 % (ref 37–54)
HGB BLD-MCNC: 17.7 G/DL (ref 12.5–16.5)
IMM GRANULOCYTES # BLD AUTO: 0.07 K/UL (ref 0–0.58)
IMM GRANULOCYTES NFR BLD: 1 % (ref 0–5)
LYMPHOCYTES NFR BLD: 2.48 K/UL (ref 1.5–4)
LYMPHOCYTES RELATIVE PERCENT: 22 % (ref 20–42)
MCH RBC QN AUTO: 28.8 PG (ref 26–35)
MCHC RBC AUTO-ENTMCNC: 32.7 G/DL (ref 32–34.5)
MCV RBC AUTO: 88.1 FL (ref 80–99.9)
MONOCYTES NFR BLD: 0.78 K/UL (ref 0.1–0.95)
MONOCYTES NFR BLD: 7 % (ref 2–12)
NEUTROPHILS NFR BLD: 69 % (ref 43–80)
NEUTS SEG NFR BLD: 7.68 K/UL (ref 1.8–7.3)
PLATELET # BLD AUTO: 272 K/UL (ref 130–450)
PMV BLD AUTO: 9.9 FL (ref 7–12)
POTASSIUM SERPL-SCNC: 4.4 MMOL/L (ref 3.5–5.1)
RBC # BLD AUTO: 6.14 M/UL (ref 3.8–5.8)
SODIUM SERPL-SCNC: 137 MMOL/L (ref 136–145)
WBC OTHER # BLD: 11.2 K/UL (ref 4.5–11.5)

## 2025-04-27 PROCEDURE — 36415 COLL VENOUS BLD VENIPUNCTURE: CPT

## 2025-04-27 PROCEDURE — 2500000003 HC RX 250 WO HCPCS: Performed by: INTERNAL MEDICINE

## 2025-04-27 PROCEDURE — 99232 SBSQ HOSP IP/OBS MODERATE 35: CPT | Performed by: INTERNAL MEDICINE

## 2025-04-27 PROCEDURE — 85025 COMPLETE CBC W/AUTO DIFF WBC: CPT

## 2025-04-27 PROCEDURE — 80048 BASIC METABOLIC PNL TOTAL CA: CPT

## 2025-04-27 PROCEDURE — 6370000000 HC RX 637 (ALT 250 FOR IP): Performed by: NURSE PRACTITIONER

## 2025-04-27 PROCEDURE — 83880 ASSAY OF NATRIURETIC PEPTIDE: CPT

## 2025-04-27 PROCEDURE — 2140000000 HC CCU INTERMEDIATE R&B

## 2025-04-27 PROCEDURE — 93308 TTE F-UP OR LMTD: CPT

## 2025-04-27 PROCEDURE — 99233 SBSQ HOSP IP/OBS HIGH 50: CPT | Performed by: INTERNAL MEDICINE

## 2025-04-27 PROCEDURE — 6370000000 HC RX 637 (ALT 250 FOR IP): Performed by: INTERNAL MEDICINE

## 2025-04-27 PROCEDURE — 6360000002 HC RX W HCPCS: Performed by: INTERNAL MEDICINE

## 2025-04-27 PROCEDURE — 93306 TTE W/DOPPLER COMPLETE: CPT | Performed by: INTERNAL MEDICINE

## 2025-04-27 RX ORDER — DIPHENHYDRAMINE HCL 25 MG
50 TABLET ORAL ONCE
Status: COMPLETED | OUTPATIENT
Start: 2025-04-28 | End: 2025-04-28

## 2025-04-27 RX ORDER — PREDNISONE 20 MG/1
50 TABLET ORAL ONCE
Status: COMPLETED | OUTPATIENT
Start: 2025-04-28 | End: 2025-04-28

## 2025-04-27 RX ORDER — PREDNISONE 20 MG/1
50 TABLET ORAL ONCE
Status: COMPLETED | OUTPATIENT
Start: 2025-04-27 | End: 2025-04-27

## 2025-04-27 RX ADMIN — AMIODARONE HYDROCHLORIDE 200 MG: 200 TABLET ORAL at 08:25

## 2025-04-27 RX ADMIN — FUROSEMIDE 20 MG: 20 TABLET ORAL at 08:24

## 2025-04-27 RX ADMIN — PREDNISONE 50 MG: 20 TABLET ORAL at 15:35

## 2025-04-27 RX ADMIN — SERTRALINE 100 MG: 100 TABLET, FILM COATED ORAL at 08:24

## 2025-04-27 RX ADMIN — SACUBITRIL AND VALSARTAN 1 TABLET: 24; 26 TABLET, FILM COATED ORAL at 08:24

## 2025-04-27 RX ADMIN — SACUBITRIL AND VALSARTAN 1 TABLET: 24; 26 TABLET, FILM COATED ORAL at 20:26

## 2025-04-27 RX ADMIN — DIPHENHYDRAMINE HYDROCHLORIDE 25 MG: 50 INJECTION INTRAMUSCULAR; INTRAVENOUS at 21:02

## 2025-04-27 RX ADMIN — AMIODARONE HYDROCHLORIDE 200 MG: 200 TABLET ORAL at 20:27

## 2025-04-27 RX ADMIN — AMIODARONE HYDROCHLORIDE 0.5 MG/MIN: 1.8 INJECTION, SOLUTION INTRAVENOUS at 04:58

## 2025-04-27 RX ADMIN — METOPROLOL SUCCINATE 50 MG: 50 TABLET, EXTENDED RELEASE ORAL at 20:26

## 2025-04-27 RX ADMIN — PANTOPRAZOLE SODIUM 40 MG: 40 TABLET, DELAYED RELEASE ORAL at 08:25

## 2025-04-27 RX ADMIN — METOPROLOL SUCCINATE 50 MG: 50 TABLET, EXTENDED RELEASE ORAL at 08:24

## 2025-04-27 RX ADMIN — APIXABAN 5 MG: 5 TABLET, FILM COATED ORAL at 08:24

## 2025-04-27 RX ADMIN — AMIODARONE HYDROCHLORIDE 0.5 MG/MIN: 1.8 INJECTION, SOLUTION INTRAVENOUS at 17:09

## 2025-04-27 RX ADMIN — LEVOTHYROXINE SODIUM 50 MCG: 0.1 TABLET ORAL at 08:25

## 2025-04-27 RX ADMIN — PREDNISONE 50 MG: 20 TABLET ORAL at 20:27

## 2025-04-27 ASSESSMENT — PAIN SCALES - GENERAL
PAINLEVEL_OUTOF10: 0

## 2025-04-27 NOTE — PLAN OF CARE
Problem: Chronic Conditions and Co-morbidities  Goal: Patient's chronic conditions and co-morbidity symptoms are monitored and maintained or improved  4/27/2025 1101 by Theresa Sparrow RN  Outcome: Progressing     Problem: Discharge Planning  Goal: Discharge to home or other facility with appropriate resources  4/27/2025 1101 by Theresa Sparrow RN  Outcome: Progressing     Problem: ABCDS Injury Assessment  Goal: Absence of physical injury  4/27/2025 1101 by Theresa Sparrow RN  Outcome: Progressing     Problem: Safety - Adult  Goal: Free from fall injury  4/27/2025 1101 by Theresa Sparrow RN  Outcome: Progressing

## 2025-04-28 PROBLEM — I50.20 HFREF (HEART FAILURE WITH REDUCED EJECTION FRACTION) (HCC): Status: ACTIVE | Noted: 2025-04-28

## 2025-04-28 LAB
ANION GAP SERPL CALCULATED.3IONS-SCNC: 11 MMOL/L (ref 7–16)
BASOPHILS # BLD: 0.02 K/UL (ref 0–0.2)
BASOPHILS NFR BLD: 0 % (ref 0–2)
BUN SERPL-MCNC: 17 MG/DL (ref 6–20)
CALCIUM SERPL-MCNC: 8.7 MG/DL (ref 8.6–10)
CHLORIDE SERPL-SCNC: 103 MMOL/L (ref 98–107)
CO2 SERPL-SCNC: 21 MMOL/L (ref 22–29)
CREAT SERPL-MCNC: 1.3 MG/DL (ref 0.7–1.2)
ECHO BSA: 2.62 M2
EKG ATRIAL RATE: 60 BPM
EKG ATRIAL RATE: 94 BPM
EKG P AXIS: 36 DEGREES
EKG P-R INTERVAL: 142 MS
EKG Q-T INTERVAL: 322 MS
EKG Q-T INTERVAL: 382 MS
EKG QRS DURATION: 82 MS
EKG QRS DURATION: 82 MS
EKG QTC CALCULATION (BAZETT): 477 MS
EKG QTC CALCULATION (BAZETT): 507 MS
EKG R AXIS: -40 DEGREES
EKG R AXIS: -49 DEGREES
EKG T AXIS: 190 DEGREES
EKG T AXIS: 198 DEGREES
EKG VENTRICULAR RATE: 149 BPM
EKG VENTRICULAR RATE: 94 BPM
EOSINOPHIL # BLD: 0 K/UL (ref 0.05–0.5)
EOSINOPHILS RELATIVE PERCENT: 0 % (ref 0–6)
ERYTHROCYTE [DISTWIDTH] IN BLOOD BY AUTOMATED COUNT: 14.2 % (ref 11.5–15)
GFR, ESTIMATED: 68 ML/MIN/1.73M2
GLUCOSE SERPL-MCNC: 122 MG/DL (ref 74–99)
HCT VFR BLD AUTO: 57.2 % (ref 37–54)
HGB BLD-MCNC: 18.9 G/DL (ref 12.5–16.5)
IMM GRANULOCYTES # BLD AUTO: 0.08 K/UL (ref 0–0.58)
IMM GRANULOCYTES NFR BLD: 1 % (ref 0–5)
LYMPHOCYTES NFR BLD: 0.88 K/UL (ref 1.5–4)
LYMPHOCYTES RELATIVE PERCENT: 6 % (ref 20–42)
MCH RBC QN AUTO: 29.1 PG (ref 26–35)
MCHC RBC AUTO-ENTMCNC: 33 G/DL (ref 32–34.5)
MCV RBC AUTO: 88.1 FL (ref 80–99.9)
MONOCYTES NFR BLD: 0.11 K/UL (ref 0.1–0.95)
MONOCYTES NFR BLD: 1 % (ref 2–12)
NEUTROPHILS NFR BLD: 92 % (ref 43–80)
NEUTS SEG NFR BLD: 13.11 K/UL (ref 1.8–7.3)
PLATELET # BLD AUTO: 301 K/UL (ref 130–450)
PMV BLD AUTO: 10.4 FL (ref 7–12)
POTASSIUM SERPL-SCNC: 4.8 MMOL/L (ref 3.5–5.1)
RBC # BLD AUTO: 6.49 M/UL (ref 3.8–5.8)
SODIUM SERPL-SCNC: 135 MMOL/L (ref 136–145)
WBC OTHER # BLD: 14.2 K/UL (ref 4.5–11.5)

## 2025-04-28 PROCEDURE — 80048 BASIC METABOLIC PNL TOTAL CA: CPT

## 2025-04-28 PROCEDURE — 6370000000 HC RX 637 (ALT 250 FOR IP): Performed by: INTERNAL MEDICINE

## 2025-04-28 PROCEDURE — B2151ZZ FLUOROSCOPY OF LEFT HEART USING LOW OSMOLAR CONTRAST: ICD-10-PCS | Performed by: INTERNAL MEDICINE

## 2025-04-28 PROCEDURE — C1769 GUIDE WIRE: HCPCS | Performed by: INTERNAL MEDICINE

## 2025-04-28 PROCEDURE — 36415 COLL VENOUS BLD VENIPUNCTURE: CPT

## 2025-04-28 PROCEDURE — 99232 SBSQ HOSP IP/OBS MODERATE 35: CPT | Performed by: INTERNAL MEDICINE

## 2025-04-28 PROCEDURE — 85025 COMPLETE CBC W/AUTO DIFF WBC: CPT

## 2025-04-28 PROCEDURE — B2111ZZ FLUOROSCOPY OF MULTIPLE CORONARY ARTERIES USING LOW OSMOLAR CONTRAST: ICD-10-PCS | Performed by: INTERNAL MEDICINE

## 2025-04-28 PROCEDURE — 6360000002 HC RX W HCPCS: Performed by: INTERNAL MEDICINE

## 2025-04-28 PROCEDURE — 2709999900 HC NON-CHARGEABLE SUPPLY: Performed by: INTERNAL MEDICINE

## 2025-04-28 PROCEDURE — C1894 INTRO/SHEATH, NON-LASER: HCPCS | Performed by: INTERNAL MEDICINE

## 2025-04-28 PROCEDURE — 93458 L HRT ARTERY/VENTRICLE ANGIO: CPT | Performed by: INTERNAL MEDICINE

## 2025-04-28 PROCEDURE — 6370000000 HC RX 637 (ALT 250 FOR IP): Performed by: NURSE PRACTITIONER

## 2025-04-28 PROCEDURE — 2140000000 HC CCU INTERMEDIATE R&B

## 2025-04-28 PROCEDURE — 99233 SBSQ HOSP IP/OBS HIGH 50: CPT | Performed by: INTERNAL MEDICINE

## 2025-04-28 PROCEDURE — 4A023N7 MEASUREMENT OF CARDIAC SAMPLING AND PRESSURE, LEFT HEART, PERCUTANEOUS APPROACH: ICD-10-PCS | Performed by: INTERNAL MEDICINE

## 2025-04-28 PROCEDURE — 2500000003 HC RX 250 WO HCPCS: Performed by: INTERNAL MEDICINE

## 2025-04-28 PROCEDURE — 6360000004 HC RX CONTRAST MEDICATION: Performed by: INTERNAL MEDICINE

## 2025-04-28 RX ORDER — FENTANYL CITRATE 50 UG/ML
INJECTION, SOLUTION INTRAMUSCULAR; INTRAVENOUS PRN
Status: DISCONTINUED | OUTPATIENT
Start: 2025-04-28 | End: 2025-04-28 | Stop reason: HOSPADM

## 2025-04-28 RX ORDER — ASPIRIN 325 MG
325 TABLET ORAL ONCE
Status: COMPLETED | OUTPATIENT
Start: 2025-04-28 | End: 2025-04-28

## 2025-04-28 RX ORDER — IOPAMIDOL 755 MG/ML
INJECTION, SOLUTION INTRAVASCULAR PRN
Status: DISCONTINUED | OUTPATIENT
Start: 2025-04-28 | End: 2025-04-28 | Stop reason: HOSPADM

## 2025-04-28 RX ORDER — HEPARIN SODIUM 10000 [USP'U]/ML
INJECTION, SOLUTION INTRAVENOUS; SUBCUTANEOUS PRN
Status: DISCONTINUED | OUTPATIENT
Start: 2025-04-28 | End: 2025-04-28 | Stop reason: HOSPADM

## 2025-04-28 RX ORDER — NITROGLYCERIN 20 MG/100ML
INJECTION INTRAVENOUS CONTINUOUS PRN
Status: COMPLETED | OUTPATIENT
Start: 2025-04-28 | End: 2025-04-28

## 2025-04-28 RX ORDER — VERAPAMIL HYDROCHLORIDE 2.5 MG/ML
INJECTION, SOLUTION INTRAVENOUS PRN
Status: DISCONTINUED | OUTPATIENT
Start: 2025-04-28 | End: 2025-04-28 | Stop reason: HOSPADM

## 2025-04-28 RX ORDER — AMIODARONE HYDROCHLORIDE 200 MG/1
200 TABLET ORAL DAILY
Qty: 30 TABLET | Refills: 3 | Status: SHIPPED | OUTPATIENT
Start: 2025-04-28

## 2025-04-28 RX ORDER — MIDAZOLAM HYDROCHLORIDE 1 MG/ML
INJECTION, SOLUTION INTRAMUSCULAR; INTRAVENOUS PRN
Status: DISCONTINUED | OUTPATIENT
Start: 2025-04-28 | End: 2025-04-28 | Stop reason: HOSPADM

## 2025-04-28 RX ORDER — ACETAMINOPHEN 325 MG/1
650 TABLET ORAL EVERY 4 HOURS PRN
Status: DISCONTINUED | OUTPATIENT
Start: 2025-04-28 | End: 2025-04-29 | Stop reason: HOSPADM

## 2025-04-28 RX ADMIN — AMIODARONE HYDROCHLORIDE 200 MG: 200 TABLET ORAL at 08:06

## 2025-04-28 RX ADMIN — SODIUM CHLORIDE, PRESERVATIVE FREE 10 ML: 5 INJECTION INTRAVENOUS at 08:06

## 2025-04-28 RX ADMIN — SACUBITRIL AND VALSARTAN 1 TABLET: 24; 26 TABLET, FILM COATED ORAL at 08:06

## 2025-04-28 RX ADMIN — AMIODARONE HYDROCHLORIDE 200 MG: 200 TABLET ORAL at 21:28

## 2025-04-28 RX ADMIN — METOPROLOL SUCCINATE 50 MG: 50 TABLET, EXTENDED RELEASE ORAL at 08:06

## 2025-04-28 RX ADMIN — METOPROLOL SUCCINATE 50 MG: 50 TABLET, EXTENDED RELEASE ORAL at 21:28

## 2025-04-28 RX ADMIN — DIPHENHYDRAMINE HYDROCHLORIDE 50 MG: 25 TABLET ORAL at 04:28

## 2025-04-28 RX ADMIN — PREDNISONE 50 MG: 20 TABLET ORAL at 04:28

## 2025-04-28 RX ADMIN — SODIUM CHLORIDE, PRESERVATIVE FREE 10 ML: 5 INJECTION INTRAVENOUS at 21:29

## 2025-04-28 RX ADMIN — SERTRALINE 100 MG: 100 TABLET, FILM COATED ORAL at 08:06

## 2025-04-28 RX ADMIN — LEVOTHYROXINE SODIUM 50 MCG: 0.1 TABLET ORAL at 05:56

## 2025-04-28 RX ADMIN — ASPIRIN 325 MG ORAL TABLET 325 MG: 325 PILL ORAL at 12:27

## 2025-04-28 RX ADMIN — PANTOPRAZOLE SODIUM 40 MG: 40 TABLET, DELAYED RELEASE ORAL at 08:06

## 2025-04-28 RX ADMIN — SACUBITRIL AND VALSARTAN 1 TABLET: 24; 26 TABLET, FILM COATED ORAL at 21:28

## 2025-04-28 ASSESSMENT — PAIN SCALES - GENERAL
PAINLEVEL_OUTOF10: 0

## 2025-04-28 NOTE — CARE COORDINATION
Transferred from [facility] where he was admitted for A-fib with RVR he failed direct-current cardioversion 3 times subsequently developed worsening palpitations requiring  EP lab study  (Discount cards provided for both entresto/eliquis). Eliquis and Lasix currently on hold.Life Vest in room. For cardiac cath today. Patient for cardiac MRI. Per previous CM note patient lives with his wife He was independent Continues on toprol xl, entresto and eliquis.  Discharge plan is home  with no needs and per patient his wife will transport  Case Management Assessment  Initial Evaluation    Date/Time of Evaluation: 4/28/2025 1:55 PM  Assessment Completed by: Pauline Montes RN    If patient is discharged prior to next notation, then this note serves as note for discharge by case management.    Patient Name: Solomon Adams                   YOB: 1980  Diagnosis: Persistent atrial fibrillation (HCC) [I48.19]                   Date / Time: 4/25/2025  4:27 PM    Patient Admission Status: Inpatient   Readmission Risk (Low < 19, Mod (19-27), High > 27): Readmission Risk Score: 15.8    Current PCP: No primary care provider on file.  PCP verified by CM? No    Chart Reviewed: Yes      History Provided by: Medical Record  Patient Orientation: Alert and Oriented    Patient Cognition: Alert    Hospitalization in the last 30 days (Readmission):  Yes    If yes, Readmission Assessment in  Navigator will be completed.    Advance Directives:      Code Status: Full Code   Patient's Primary Decision Maker is: Legal Next of Kin      Discharge Planning:    Patient lives with: Spouse/Significant Other Type of Home: House  Primary Care Giver: Self  Patient Support Systems include: Spouse/Significant Other   Current Financial resources:    Current community resources:    Current services prior to admission: None            Current DME:              Type of Home Care services:  None    ADLS  Prior functional level: Independent in

## 2025-04-29 ENCOUNTER — TELEPHONE (OUTPATIENT)
Dept: CARDIOLOGY CLINIC | Age: 45
End: 2025-04-29

## 2025-04-29 VITALS
WEIGHT: 299.4 LBS | SYSTOLIC BLOOD PRESSURE: 80 MMHG | RESPIRATION RATE: 14 BRPM | HEART RATE: 72 BPM | TEMPERATURE: 96.9 F | DIASTOLIC BLOOD PRESSURE: 48 MMHG | HEIGHT: 72 IN | BODY MASS INDEX: 40.55 KG/M2 | OXYGEN SATURATION: 95 %

## 2025-04-29 LAB
ANION GAP SERPL CALCULATED.3IONS-SCNC: 10 MMOL/L (ref 7–16)
BASOPHILS # BLD: 0.02 K/UL (ref 0–0.2)
BASOPHILS NFR BLD: 0 % (ref 0–2)
BUN SERPL-MCNC: 19 MG/DL (ref 6–20)
CALCIUM SERPL-MCNC: 8.4 MG/DL (ref 8.6–10)
CHLORIDE SERPL-SCNC: 108 MMOL/L (ref 98–107)
CO2 SERPL-SCNC: 23 MMOL/L (ref 22–29)
CREAT SERPL-MCNC: 1.5 MG/DL (ref 0.7–1.2)
EOSINOPHIL # BLD: 0.01 K/UL (ref 0.05–0.5)
EOSINOPHILS RELATIVE PERCENT: 0 % (ref 0–6)
ERYTHROCYTE [DISTWIDTH] IN BLOOD BY AUTOMATED COUNT: 14.5 % (ref 11.5–15)
GFR, ESTIMATED: 60 ML/MIN/1.73M2
GLUCOSE SERPL-MCNC: 102 MG/DL (ref 74–99)
HCT VFR BLD AUTO: 52.8 % (ref 37–54)
HGB BLD-MCNC: 17.4 G/DL (ref 12.5–16.5)
IMM GRANULOCYTES # BLD AUTO: 0.07 K/UL (ref 0–0.58)
IMM GRANULOCYTES NFR BLD: 0 % (ref 0–5)
LYMPHOCYTES NFR BLD: 2.06 K/UL (ref 1.5–4)
LYMPHOCYTES RELATIVE PERCENT: 12 % (ref 20–42)
MCH RBC QN AUTO: 29.1 PG (ref 26–35)
MCHC RBC AUTO-ENTMCNC: 33 G/DL (ref 32–34.5)
MCV RBC AUTO: 88.4 FL (ref 80–99.9)
MONOCYTES NFR BLD: 1.05 K/UL (ref 0.1–0.95)
MONOCYTES NFR BLD: 6 % (ref 2–12)
NEUTROPHILS NFR BLD: 81 % (ref 43–80)
NEUTS SEG NFR BLD: 13.45 K/UL (ref 1.8–7.3)
PLATELET # BLD AUTO: 279 K/UL (ref 130–450)
PMV BLD AUTO: 10.6 FL (ref 7–12)
POTASSIUM SERPL-SCNC: 4.4 MMOL/L (ref 3.5–5.1)
RBC # BLD AUTO: 5.97 M/UL (ref 3.8–5.8)
SODIUM SERPL-SCNC: 140 MMOL/L (ref 136–145)
WBC OTHER # BLD: 16.7 K/UL (ref 4.5–11.5)

## 2025-04-29 PROCEDURE — 2500000003 HC RX 250 WO HCPCS: Performed by: INTERNAL MEDICINE

## 2025-04-29 PROCEDURE — 85025 COMPLETE CBC W/AUTO DIFF WBC: CPT

## 2025-04-29 PROCEDURE — 6370000000 HC RX 637 (ALT 250 FOR IP): Performed by: INTERNAL MEDICINE

## 2025-04-29 PROCEDURE — 99239 HOSP IP/OBS DSCHRG MGMT >30: CPT | Performed by: INTERNAL MEDICINE

## 2025-04-29 PROCEDURE — 36415 COLL VENOUS BLD VENIPUNCTURE: CPT

## 2025-04-29 PROCEDURE — 80048 BASIC METABOLIC PNL TOTAL CA: CPT

## 2025-04-29 RX ORDER — METOPROLOL SUCCINATE 50 MG/1
50 TABLET, EXTENDED RELEASE ORAL 2 TIMES DAILY
Qty: 30 TABLET | Refills: 4 | Status: SHIPPED | OUTPATIENT
Start: 2025-04-29

## 2025-04-29 RX ORDER — FUROSEMIDE 20 MG/1
20 TABLET ORAL DAILY
Qty: 60 TABLET | Refills: 3 | Status: SHIPPED | OUTPATIENT
Start: 2025-04-29

## 2025-04-29 RX ADMIN — LEVOTHYROXINE SODIUM 50 MCG: 0.1 TABLET ORAL at 06:48

## 2025-04-29 RX ADMIN — SERTRALINE 100 MG: 100 TABLET, FILM COATED ORAL at 08:48

## 2025-04-29 RX ADMIN — AMIODARONE HYDROCHLORIDE 200 MG: 200 TABLET ORAL at 08:48

## 2025-04-29 RX ADMIN — PANTOPRAZOLE SODIUM 40 MG: 40 TABLET, DELAYED RELEASE ORAL at 08:48

## 2025-04-29 RX ADMIN — SODIUM CHLORIDE, PRESERVATIVE FREE 10 ML: 5 INJECTION INTRAVENOUS at 08:48

## 2025-04-29 ASSESSMENT — PAIN SCALES - GENERAL: PAINLEVEL_OUTOF10: 0

## 2025-04-29 NOTE — PROGRESS NOTES
HOSPITALIST PROGRESS NOTES     ADMITTING DATE AND TIME: 4/25/2025  4:27 PM  had no chief complaint listed for this encounter.    ADMIT DX: Persistent atrial fibrillation (HCC) [I48.19]      SUBJECTIVE:  Patient is being followed for Persistent atrial fibrillation (HCC) [I48.19]     Patient was seen examined and evaluated  Recent lab results, charts and pertinent diagnostic imaging reviewed   Ancillary service notes reviewed   NAD   Discussed with partner     Care reviewed with nursing staff, medical and surgical specialty care, primary care and the patient's family as available.   ROS: denies fever, chills, cp, sob, n/v, HA unless stated above.      apixaban  5 mg Oral BID    furosemide  20 mg Oral Daily    levothyroxine  50 mcg Oral Daily    metoprolol succinate  50 mg Oral BID    pantoprazole  40 mg Oral Daily    sacubitril-valsartan  1 tablet Oral BID    sertraline  100 mg Oral Daily    sodium chloride flush  5-40 mL IntraVENous 2 times per day    amiodarone  200 mg Oral BID     sodium chloride flush, 5-40 mL, PRN  sodium chloride, , PRN  potassium chloride, 40 mEq, PRN   Or  potassium alternative oral replacement, 40 mEq, PRN   Or  potassium chloride, 10 mEq, PRN  magnesium sulfate, 2,000 mg, PRN  ondansetron, 4 mg, Q8H PRN   Or  ondansetron, 4 mg, Q6H PRN  polyethylene glycol, 17 g, Daily PRN  acetaminophen, 650 mg, Q6H PRN   Or  acetaminophen, 650 mg, Q6H PRN  albuterol, 2.5 mg, Q4H PRN  guaiFENesin-dextromethorphan, 5 mL, Q4H PRN  diphenhydrAMINE, 25 mg, Nightly PRN         OBJECTIVE:    /85   Pulse (!) 102   Temp 97.6 °F (36.4 °C) (Temporal)   Resp 20   Ht 1.829 m (6')   Wt (!) 136.7 kg (301 lb 4.8 oz)   SpO2 96%   BMI 40.86 kg/m²     General Appearance: alert and oriented to person, place and time and in no acute distress  Skin: warm and dry  Head: normocephalic and 
                                                                                                                                HOSPITALIST PROGRESS NOTES     ADMITTING DATE AND TIME: 4/25/2025  4:27 PM  had no chief complaint listed for this encounter.    ADMIT DX: Persistent atrial fibrillation (HCC) [I48.19]      SUBJECTIVE:  Patient is being followed for Persistent atrial fibrillation (HCC) [I48.19]     Patient was seen examined and evaluated  Recent lab results, charts and pertinent diagnostic imaging reviewed   Ancillary service notes reviewed   NAD, in NSR  Discussed with partner     Care reviewed with nursing staff, medical and surgical specialty care, primary care and the patient's family as available.   ROS: denies fever, chills, cp, sob, n/v, HA unless stated above.      [Held by provider] apixaban  5 mg Oral BID    [Held by provider] furosemide  20 mg Oral Daily    levothyroxine  50 mcg Oral Daily    metoprolol succinate  50 mg Oral BID    pantoprazole  40 mg Oral Daily    sacubitril-valsartan  1 tablet Oral BID    sertraline  100 mg Oral Daily    sodium chloride flush  5-40 mL IntraVENous 2 times per day    amiodarone  200 mg Oral BID     sodium chloride flush, 5-40 mL, PRN  sodium chloride, , PRN  potassium chloride, 40 mEq, PRN   Or  potassium alternative oral replacement, 40 mEq, PRN   Or  potassium chloride, 10 mEq, PRN  magnesium sulfate, 2,000 mg, PRN  ondansetron, 4 mg, Q8H PRN   Or  ondansetron, 4 mg, Q6H PRN  polyethylene glycol, 17 g, Daily PRN  acetaminophen, 650 mg, Q6H PRN   Or  acetaminophen, 650 mg, Q6H PRN  albuterol, 2.5 mg, Q4H PRN  guaiFENesin-dextromethorphan, 5 mL, Q4H PRN  diphenhydrAMINE, 25 mg, Nightly PRN         OBJECTIVE:    /80   Pulse 77   Temp 97 °F (36.1 °C) (Temporal)   Resp 14   Ht 1.829 m (6')   Wt 135.4 kg (298 lb 9 oz)   SpO2 97%   BMI 40.49 kg/m²     General Appearance: alert and oriented to person, place and time and in no acute distress  Skin: warm and 
    Inpatient Cardiology Progress note     PATIENT IS BEING FOLLOWED FOR: Cardiomyopathy, new onset atrial fibrillation    Solomon Adams is a 44 y.o. male known to Dr. Duffy from this admission.  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      He presented to the emergency department 2025 with complaints of shortness of breath that started 4 weeks prior to admission with nausea, fever, yellow productive cough.  Initially seen by his PCP 4 days prior and started on steroids and amoxicillin.  In the ED he was found to be in new onset A-fib RVR and evaluated by Dr. Duffy.  Patient was started on Cardizem IV gtt. and Lopressor 50 mg daily.  Patient underwent BELKIS guided cardioversion 2025 with successful cardioversion however revealed an EF of 20-25%.  Patient went back into A-fib RVR the following day and resumed on Cardizem gtt.  On 2025 he was initiated on p.o. amiodarone.  He underwent DCCV x 3 on 2025 with initial conversion to sinus rhythm however back into A-fib RVR after approximately 5-10 minutes.  At this time he was initiated on amiodarone gtt. and decision was made to transfer to tertiary facility for evaluation by EP.    Patient is lying in bed and appears to be in no acute distress.  His wife is also at the bedside.  Patient denies any chest pain, palpitations, lightheadedness, dizziness, nausea, vomiting.     PHYSICAL EXAM:   /85   Pulse (!) 102   Temp 97.6 °F (36.4 °C) (Temporal)   Resp 20   Ht 1.829 m (6')   Wt (!) 136.7 kg (301 lb 4.8 oz)   SpO2 96%   BMI 40.86 kg/m²    B/P Range last 24 hours: Systolic (24hrs), Av , Min:93 , Max:143    Diastolic (24hrs), Av, Min:62, Max:115  CONST: Obese  male. Awake, alert and cooperative. No apparent distress  HEENT:   Head- Normocephalic, atraumatic 
4 Eyes Skin Assessment     NAME:  Solomon Adams  YOB: 1980  MEDICAL RECORD NUMBER:  [id number]    The patient is being assessed for  Admission    I agree that at least one RN has performed a thorough Head to Toe Skin Assessment on the patient. ALL assessment sites listed below have been assessed.      Areas assessed by both nurses:    Head, Face, Ears, Shoulders, Back, Chest, Arms, Elbows, Hands, Sacrum. Buttock, Coccyx, Ischium, Legs. Feet and Heels, and Under Medical Devices         Does the Patient have a Wound? No noted wound(s)       George Prevention initiated by RN: No  Wound Care Orders initiated by RN: No    Pressure Injury (Stage 3,4, Unstageable, DTI, NWPT, and Complex wounds) if present, place Wound referral order by RN under : No    New Ostomies, if present place, Ostomy referral order under : No     Nurse 1 eSignature: Electronically signed by Theresa Sparrow RN on 4/25/25 at 6:52 PM EDT    **SHARE this note so that the co-signing nurse can place an eSignature**    Nurse 2 eSignature: Electronically signed by Lacie Chapman RN on 4/25/25 at 6:52 PM EDT  
CLINICAL PHARMACY NOTE: MEDS TO BEDS    Total # of Prescriptions Filled: 2   The following medications were delivered to the patient:  Furosemide 20 mg  Entresto 24/26 mg    Additional Documentation: wife Martine picked up in pharmacy     
Cardiology consult sent via scrible.   
Discharge instructions explained in detail to pt and wife at bedside. All questions answered. I emphasized the importance of attending follow up appointments and medication compliance. I also educated pt on taking his blood pressure prior to any medication administrations- HOLD  entresto, metoprolol, and lasix for systolic <100    IV x2 and heart monitor removed    Velma Garza RN    
EP consult sent via perfect serve.  
MRI on hold, 4/28 2pm patient just had heart cath and needs 6 hours to recover, (Rn says pt cannot come to MRI this afternoon). Can try Wednesday if MRI wide bore gets an opening.   
Messaged attending regarding BP 80/48 manually, pt asymptomatic. Morning entresto and metoprolol held d/t parameters and still ok to dc    Velma Garza RN    
Ok to dc from EP perspective with medication times changing    Velma Garza RN      
Patient has been scheduled for their Cardiac MRI as an outpatient. They have been given the date and time with instructions for testing. Date of testing: Friday May 9th with 8:30 arrival for a 9am scan. Velma SUMNER on floor has been notified that appointment has been made.  
RN messaged Dr. Charles regarding patient requesting information on discharge from hospital. Awaiting response.   
RN messaged Dr. Sampson for diet order.   
RN messaged Dr. Sampson regarding patient's request for D/C this AM. Awaiting response.   
RN messaged both cardiology and EP to see if patient is okay for diet order. Per both services, patient is okay for diet.   
RN spoke with Dr. Charles regarding patient's request for discharge this evening as he was told he would be rounded on and likely released. Dr. Charles notified that heart cath was clean and had no acute findings. Per Dr. Charles, as long as nothing else is holding him up he is okay to discharge from cardiology's perspective.     RN messaged Stefany Cordero NP regarding above situation. Awaiting response.     Per Stefany Cordero, she is unable to discharge patient's on night shift and patient can leave AMA if desired. RN updated patient. Patient upset regarding response. RN informed patient that we could attempt to expedite his D/C in the AM to avoid consequences of going AMA. Patient stated he would wait until the AM.   
Received call from [facility] that patient was not taking Metoprolol at home and needs a new prescription for it     Notified Dr Sampson via [employer].       1111- Dr Sampson sent script for Metoprolol to [facility].   
Spoke with Dr De La Torre. She is requesting LHC as EF is still low in SR. Will hold Eliquis and resume post LHC (last dose 4/27/2025 in am). Recent DCCV x 2  Will pre-medicate for iodine allergy  NPO after midnight except medication  Electronically signed by PAYAL PENA on 4/27/2025 at 2:43 PM    
[facility]- [facility] [place]  Inpatient Progress Note.   PATIENT: Solomon Adams  MEDICAL RECORD NUMBER: [id number]  DATE OF SERVICE:  4/28/2025  ATTENDING ELECTROPHYSIOLOGIST: Yoli Nails MD   PRIMARY ELECTROPHYSIOLOGIST: Stella De La Torre MD  REFERRING PHYSICIAN: Damien Medel DO and No primary care provider on file.  CHIEF COMPLAINT: Fatigue, dyspnea, syncope    HPI: This is a 44 y.o. male with a history of obesity, hypothyroidism, on testosterone therapy as well as anabolic steroids in the past who presented to the hospital in [place] with complaints of increasing shortness of breath.  The patient has been noticing symptoms of fatigue and dyspnea for several weeks although in the last 2 weeks his symptoms have worsened.  He also developed high fever and possibly syncope once or twice.  He says he had a high fever and collapsed in his kitchen.  No prior cardiac history or history of syncope  Not known to [facility] cardiology or electrophysiology.  His home medications include Adderall 15 mg daily, Synthroid 50 mcg daily, Protonix, Zoloft  He was initially seen by cardiology and placed on Lopressor for rate control.  He was also on IV Cardizem initially and subsequently underwent BELKIS guided cardioversion on 4/21/2025.  Although initially successful atrial fibrillation with rapid ventricular rate recurred.  He was placed on amiodarone and subsequently cardioversion was reattempted on the 24th unsuccessfully.  He was therefore transferred to the main [facility] in [place] for EP evaluation.  He is therefore on Eliquis 5 twice daily, amiodarone 200 twice daily, metoprolol succinate 50 twice daily, Synthroid 50 and 50 mcg daily, Entresto 24/26 twice a day and Zoloft    Cardiac electrophysiology service is consulted for AF with RVR, LV systolic dysfunction.    4/27/25: Patient denies any complaints today.  No chest pain or shortness of breath.  No symptoms of paroxysmal 
concerns about the content of this note or information contained within the body of this dictation they should be addressed directly with the author for clarification.    Electronically signed by Mathieu Sampson MD on 4/27/2025 at 10:49 AM      
demonstrates borderline cardiomegaly with  mild increased markings.  There are no focal alveolar infiltrates or  effusions.     IMPRESSION:  Borderline cardiomegaly with mild increased markings.      Telemetry: Sinus rhythm since earlier today    EK2025: Sinus rhythm, inferior Q waves, left axis deviation     Echocardiogram:   2025  BELKIS  Interpretation Summary  Show Result Comparison     Left Ventricle: Severely reduced left ventricular systolic function with a visually estimated EF of 20 - 25%. Left ventricle size is normal. Normal wall thickness. Severe global hypokinesis present.    Left Atrium: Left atrium is borderline dilated. No left atrial or atrail appendage thrombus noted.    Right Ventricle: Right ventricle size is normal. Reduced systolic function visually.    Tricuspid Valve: Mild regurgitation.    Pericardium: No pericardial effusion.    No prior study.        I have independently reviewed all of the ECGs and rhythm strips per above     Assessment/Plan: This is a 44 y.o. male with     1.  Persistent atrial fibrillation--noted during this hospitalization for the first time  New onset  [id number] score of 1--heart failure  Currently on Eliquis  On IV and oral amiodarone--sinus rhythm restored earlier today    2.  HFrEF--possibly nonischemic cardiomyopathy  ?  Tachycardia related  Currently on Entresto, metoprolol    Repeat limited 2D study shows LVEF of 30 to 35%.    3.  RICHIE on CKD?  Serum creatinine  was 1.15 in , 1.3-1.5 subsequently  1.4 today    4.   Morbid Obesity-BMI 40.86 kg/m2  Possible Sleep apnea--needs w/up  Hx of anabolic steroid use    5.   ADHD--on adderall    6.  Hypogonadism--has been on testosterone    Recommendations:    Continue IV and oral amiodarone.  Discontinue IV amiodarone in a.m.  Coronary angiography tomorrow.  Discussed with patient  Cardiac MRI will be ordered but may have to be done as outpatient in view of his weight and chest size  Evaluation and

## 2025-04-29 NOTE — PATIENT CARE CONFERENCE
P Quality Flow/Interdisciplinary Rounds Progress Note        Quality Flow Rounds held on April 29, 2025    Disciplines Attending:  Bedside Nurse, , , and Nursing Unit Leadership    Solomon Adams was admitted on 4/25/2025  4:27 PM    Anticipated Discharge Date:       Disposition:    George Score:  George Scale Score: 22    [facility] RISK OF UNPLANNED READMISSION 2.0             16.5 Total Score        Discussed patient goal for the day, patient clinical progression, and barriers to discharge.  The following Goal(s) of the Day/Commitment(s) have been identified:  Labs - Report Results and discharge by howie Roberto RN  April 29, 2025

## 2025-04-29 NOTE — CARE COORDINATION
Discharge order noted. Per nursing - cardiac MRI to be done outpt Per prior cm note- Discharge plan is home with no needs and per patient his wife will transport.Electronically signed by Lorna Ricardo RN on 4/29/2025 at 9:07 AM

## 2025-04-29 NOTE — PLAN OF CARE
Problem: Chronic Conditions and Co-morbidities  Goal: Patient's chronic conditions and co-morbidity symptoms are monitored and maintained or improved  Outcome: Adequate for Discharge     Problem: Discharge Planning  Goal: Discharge to home or other facility with appropriate resources  Outcome: Adequate for Discharge     Problem: ABCDS Injury Assessment  Goal: Absence of physical injury  Outcome: Adequate for Discharge     Problem: Safety - Adult  Goal: Free from fall injury  Outcome: Adequate for Discharge

## 2025-04-29 NOTE — DISCHARGE SUMMARY
well as the importance of follow-up.  Their questions are answered at this time and they are agreeable with the plan for discharge home.    Continued appropriate risk factor modification of blood pressure, diabetes and serum lipids will remain essential to reducing risk of future atherosclerotic development      Discharge Exam:    Patient was seen and examined on the day of discharge.    General Appearance: alert and oriented to person, place and time and in no acute distress  Skin: warm and dry  Head: normocephalic and atraumatic  Eyes: pupils equal, round, and reactive to light, extraocular eye movements intact, conjunctivae normal  Neck: neck supple and non tender without mass   Pulmonary/Chest: clear to auscultation bilaterally- no wheezes, rales or rhonchi, normal air movement, no respiratory distress  Cardiovascular: normal rate, normal S1 and S2 and no carotid bruits  Abdomen: soft, non-tender, non-distended, normal bowel sounds, no masses or organomegaly  Extremities: no cyanosis, no clubbing and no edema  Neurologic: no cranial nerve deficit and speech normal    I/O last 3 completed shifts:  In: 1344.1 [P.O.:540; I.V.:804.1]  Out: 10 [Blood:10]  I/O this shift:  In: 240 [P.O.:240]  Out: -       LABS:  Recent Labs     04/27/25  0635 04/28/25  0702 04/29/25  0558    135* 140   K 4.4 4.8 4.4    103 108*   CO2 20* 21* 23   BUN 12 17 19   CREATININE 1.4* 1.3* 1.5*   GLUCOSE 97 122* 102*   CALCIUM 8.5* 8.7 8.4*       Recent Labs     04/27/25  0635 04/28/25  0702 04/29/25  0558   WBC 11.2 14.2* 16.7*   RBC 6.14* 6.49* 5.97*   HGB 17.7* 18.9* 17.4*   HCT 54.1* 57.2* 52.8   MCV 88.1 88.1 88.4   MCH 28.8 29.1 29.1   MCHC 32.7 33.0 33.0   RDW 14.2 14.2 14.5    301 279   MPV 9.9 10.4 10.6       No results for input(s): \"POCGLU\" in the last 72 hours.    Imaging:  No results found.    Patient Instructions:      Medication List        CHANGE how you take these medications      amiodarone 200 MG

## 2025-04-29 NOTE — TELEPHONE ENCOUNTER
----- Message from ELSIE Romano CNP sent at 4/29/2025 11:09 AM EDT -----  Regarding: Hospital follow-up  Can we please have a follow-up with Dr. Duffy for this patient in 3-4 weeks, thank you

## 2025-04-29 NOTE — DISCHARGE INSTRUCTIONS
Take all medications as prescribed and do not stop taking medications unless told to do so by a physician. If you feel you are experiencing a side effect of a medication, please contact your primary care physician immediately.     Call and schedule follow up appointments as directed in this packet and be sure to attend.     In the event your symptoms worsen after leaving the hospital, please call your primary care provider. If it is a medical emergency please call 911 or go to the nearest emergency room immediately.

## 2025-05-05 ENCOUNTER — TELEPHONE (OUTPATIENT)
Dept: CARDIOLOGY CLINIC | Age: 45
End: 2025-05-05

## 2025-05-05 ENCOUNTER — LAB (OUTPATIENT)
Dept: CARDIOLOGY CLINIC | Age: 45
End: 2025-05-05
Payer: COMMERCIAL

## 2025-05-05 DIAGNOSIS — I48.91 ATRIAL FIBRILLATION WITH RVR (HCC): Primary | ICD-10-CM

## 2025-05-05 PROCEDURE — 93000 ELECTROCARDIOGRAM COMPLETE: CPT | Performed by: INTERNAL MEDICINE

## 2025-05-08 ENCOUNTER — RESULTS FOLLOW-UP (OUTPATIENT)
Age: 45
End: 2025-05-08

## 2025-05-08 ENCOUNTER — OFFICE VISIT (OUTPATIENT)
Age: 45
End: 2025-05-08

## 2025-05-08 VITALS
RESPIRATION RATE: 18 BRPM | DIASTOLIC BLOOD PRESSURE: 68 MMHG | HEIGHT: 72 IN | WEIGHT: 287 LBS | HEART RATE: 66 BPM | OXYGEN SATURATION: 97 % | BODY MASS INDEX: 38.87 KG/M2 | SYSTOLIC BLOOD PRESSURE: 104 MMHG

## 2025-05-08 DIAGNOSIS — I50.20 HFREF (HEART FAILURE WITH REDUCED EJECTION FRACTION) (HCC): Primary | ICD-10-CM

## 2025-05-08 LAB
ANION GAP SERPL CALCULATED.3IONS-SCNC: 11 MMOL/L (ref 7–16)
BUN BLDV-MCNC: 21 MG/DL (ref 6–20)
CALCIUM SERPL-MCNC: 8.7 MG/DL (ref 8.6–10)
CHLORIDE BLD-SCNC: 106 MMOL/L (ref 98–107)
CO2: 21 MMOL/L (ref 22–29)
CREAT SERPL-MCNC: 1.5 MG/DL (ref 0.7–1.2)
GFR, ESTIMATED: 57 ML/MIN/1.73M2
GLUCOSE BLD-MCNC: 90 MG/DL (ref 74–99)
NT PRO BNP: 307 PG/ML (ref 0–125)
POTASSIUM SERPL-SCNC: 4.8 MMOL/L (ref 3.5–5.1)
SODIUM BLD-SCNC: 138 MMOL/L (ref 136–145)

## 2025-05-08 RX ORDER — FUROSEMIDE 20 MG/1
20 TABLET ORAL DAILY PRN
Qty: 60 TABLET | Refills: 3 | Status: SHIPPED | OUTPATIENT
Start: 2025-05-08

## 2025-05-08 NOTE — PATIENT INSTRUCTIONS
Get blood work in CHF clinic today   Decrease lasix to 20 mg daily AS NEEDED  Once we review your blood work we will call you with any medication changes  Continue to wear lifevest  Has cardiac MRI scheduled  Referral to sleep medicine   Low impact exercise, avoid heavy lifting  Follow up in CHF clinic as scheduled - sooner if needed please call   Follow up with Dr. Duffy as scheduled   Weigh yourself daily    -Stay Hydrated, 64 oz     -Diet should sodium restricted to 2 grams    -Again watch your daily weight trends and if you gain water weight please follow below instructions.    -If you gain 3-5 pounds in 2-3 days OR notice that you are retaining fluid in anyway just like you did before then take an extra dose of your water pill (furosemide/Lasix) every day until you lose the weight or feel better.     -If you notice that you have taken more than 2 extra doses in 1 week then please call and let us know.    -If at any time you feel that you are retaining fluid, your medications are not working, or you feel ill in anyway, then please call us for either same day appointment or the next day, and for instructions. Our goal is to keep you out of the emergency room and the hospital and we have ways to do it. You just need to call us in a timely manner.     -If you become sick for other reasons, and notice that you are not urinating as much, the urine is very dark, you have significant diarrhea or vomiting, then please DO NOT take your water pill and CALL US immediately.

## 2025-05-08 NOTE — RESULT ENCOUNTER NOTE
Labs reviewed  Decrease lasix to PRN as discussed   Start Jardiance 10 mg daily   Follow up in CHF clinic as scheduled

## 2025-05-08 NOTE — PROGRESS NOTES
regurgitation  Mild pulmonary hypertension  Ejection fraction 25 to 30%     [facility] (4/28/2025)  Left Main  No angiographically significant stenosis.  Left Anterior Descending  No angiographically significant stenosis.  First Diagonal Branch  No angiographically significant stenosis.  Second Diagonal Branch  No angiographically significant stenosis.  Left Circumflex  No angiographically significant stenosis.  First Obtuse Marginal Branch  No angiographically significant stenosis.  Second Obtuse Marginal Branch  No angiographically significant stenosis.  Right Coronary Artery  No angiographically significant stenosis.  Right Posterior Descending Artery  No angiographically significant stenosis.  First Right Posterolateral Branch  Bifurcating vessel. No angiographically significant stenosis.    Telemetry  Sinus Rhythm     LifeVest Interrogation:        ASSESSMENT:  Chronic HFrEF  ACC stage C / NYHA class II  Nonischemic cardiomyopathy, tachycardia vs viral cardiomyopathy  LVEF 25-30%, LVEDD 4.8, LVMI 81.6  PAF s/p DCCV (4/21/2025, recurrent afib s/p DCCV 4/24/2025, recurrent afib started on amiodarone and converted to SR). OAC with Eliquis  Hypothyroidism, on HRT  Morbid obesity and very probable obstructive sleep apnea  Tobacco use with occasional cigar  Allergic to iodine       PLAN:  Get blood work in CHF clinic today   Decrease lasix to 20 mg daily AS NEEDED  Once we review your blood work we will call you with any medication changes  Continue to wear lifevest  Has cardiac MRI scheduled  Referral to sleep medicine   Low impact exercise, avoid heavy lifting  Follow up in CHF clinic as scheduled - sooner if needed please call   Follow up with Dr. Duffy as scheduled   Weigh yourself daily    -Stay Hydrated, 64 oz     -Diet should sodium restricted to 2 grams    -Again watch your daily weight trends and if you gain water weight please follow below instructions.    -If you gain 3-5 pounds in 2-3 days OR notice that you are

## 2025-05-09 ENCOUNTER — PATIENT MESSAGE (OUTPATIENT)
Age: 45
End: 2025-05-09

## 2025-05-09 ENCOUNTER — HOSPITAL ENCOUNTER (OUTPATIENT)
Dept: MRI IMAGING | Age: 45
Discharge: HOME | End: 2025-05-11
Payer: COMMERCIAL

## 2025-05-09 ENCOUNTER — HOSPITAL ENCOUNTER (EMERGENCY)
Age: 45
Discharge: HOME | End: 2025-05-09
Attending: EMERGENCY MEDICINE
Payer: COMMERCIAL

## 2025-05-09 VITALS
HEIGHT: 72 IN | BODY MASS INDEX: 38.6 KG/M2 | HEART RATE: 76 BPM | TEMPERATURE: 98.1 F | SYSTOLIC BLOOD PRESSURE: 128 MMHG | DIASTOLIC BLOOD PRESSURE: 74 MMHG | WEIGHT: 285 LBS | RESPIRATION RATE: 18 BRPM | OXYGEN SATURATION: 95 %

## 2025-05-09 DIAGNOSIS — T50.8X5A ALLERGIC REACTION TO CONTRAST DYE, INITIAL ENCOUNTER: Primary | ICD-10-CM

## 2025-05-09 DIAGNOSIS — I42.8 OTHER CARDIOMYOPATHY (HCC): ICD-10-CM

## 2025-05-09 DIAGNOSIS — I50.20 HFREF (HEART FAILURE WITH REDUCED EJECTION FRACTION) (HCC): ICD-10-CM

## 2025-05-09 PROCEDURE — 99284 EMERGENCY DEPT VISIT MOD MDM: CPT

## 2025-05-09 PROCEDURE — 75561 CARDIAC MRI FOR MORPH W/DYE: CPT

## 2025-05-09 PROCEDURE — 75561 CARDIAC MRI FOR MORPH W/DYE: CPT | Performed by: INTERNAL MEDICINE

## 2025-05-09 PROCEDURE — 96374 THER/PROPH/DIAG INJ IV PUSH: CPT

## 2025-05-09 PROCEDURE — A9579 GAD-BASE MR CONTRAST NOS,1ML: HCPCS | Performed by: INTERNAL MEDICINE

## 2025-05-09 PROCEDURE — 96375 TX/PRO/DX INJ NEW DRUG ADDON: CPT

## 2025-05-09 PROCEDURE — 6360000002 HC RX W HCPCS

## 2025-05-09 PROCEDURE — 2500000003 HC RX 250 WO HCPCS

## 2025-05-09 PROCEDURE — 6360000004 HC RX CONTRAST MEDICATION: Performed by: INTERNAL MEDICINE

## 2025-05-09 RX ORDER — DIPHENHYDRAMINE HYDROCHLORIDE 50 MG/ML
50 INJECTION, SOLUTION INTRAMUSCULAR; INTRAVENOUS ONCE
Status: COMPLETED | OUTPATIENT
Start: 2025-05-09 | End: 2025-05-09

## 2025-05-09 RX ORDER — DEXAMETHASONE SODIUM PHOSPHATE 10 MG/ML
10 INJECTION, SOLUTION INTRAMUSCULAR; INTRAVENOUS ONCE
Status: COMPLETED | OUTPATIENT
Start: 2025-05-09 | End: 2025-05-09

## 2025-05-09 RX ADMIN — SODIUM CHLORIDE, PRESERVATIVE FREE 20 MG: 5 INJECTION INTRAVENOUS at 10:35

## 2025-05-09 RX ADMIN — GADOTERIDOL 30 ML: 279.3 INJECTION, SOLUTION INTRAVENOUS at 12:40

## 2025-05-09 RX ADMIN — DIPHENHYDRAMINE HYDROCHLORIDE 50 MG: 50 INJECTION INTRAMUSCULAR; INTRAVENOUS at 10:35

## 2025-05-09 RX ADMIN — DEXAMETHASONE SODIUM PHOSPHATE 10 MG: 10 INJECTION INTRAMUSCULAR; INTRAVENOUS at 10:35

## 2025-05-09 NOTE — ED PROVIDER NOTES
[facility] EMERGENCY DEPARTMENT  EMERGENCY DEPARTMENT ENCOUNTER        Pt Name: Solomon Adams  MRN: [id number]  Birthdate 1980  Date of evaluation: 5/9/2025  Provider: Zack Castellon MD  PCP: No primary care provider on file.  Note Started: 10:31 AM EDT 5/9/25    CHIEF COMPLAINT       Chief Complaint   Patient presents with    Allergic Reaction     Allergic reaction to MRI contrast     HISTORY OF PRESENT ILLNESS: 1 or more Elements   History From: Patient    Limitations to history : None    Solomon Adams is a 44 y.o. male with past medical history of atrial fibrillation on Eliquis, CHF, obesity, anabolic steroid use, cardiomyopathy status post LifeVest placement, GERD, hypothyroidism who presents from outpatient MRI center with complaints of allergic reaction.  Patient was ordering MRI to undergo a cardiac MRI with contrast at that time.  Patient was given IV contrast that was gadolinium with no known previous history of allergy to MRI contrast.  MRI technician states patient immediately began complaining of nasal congestion, scratchy throat, erythematous pruritic rash to the neck and chest.  Contrast was stopped at that time and MRI was halted.  RRT was called.  On my arrival patient was complaining of a erythematous rash with evidence of hives to the chest and neck with no angioedema noted.  Patient does complain of scratchy throat but denies any shortness of breath or other complaints including chest pain, headache, blurry vision, numbness or tingling extremities, dizziness or lightheadedness, abdominal pain, nausea, diarrhea, dysuria, hematuria.  Patient denies any tobacco, EtOH, drug use at this time.    Nursing Notes were all reviewed and agreed with or any disagreements were addressed in the HPI.    ROS:   Pertinent positives and negatives are stated within HPI, all other systems reviewed and are negative.    --------------------------------------------- PAST HISTORY  be included for SEP-1 Core Measure due to: Infection is not suspected    Discussion with Other Professionals: See ED course  CONSULTS: discussion with bolded \"IP consult\", otherwise consult was likely placed by admitting service  None    Social determinants of health affecting patient care:  stress, not elsewhere classified    Records Reviewed : Source discharge summary for 4/29/25    CONSULTS: None    DISPOSITION:     Pt will be d/c and will follow up with [facility] PCP hotline to set up PCP for outpatient evaluation and follow-up. he is educated on signs and symptoms that require emergent evaluation. Pt is advised to return to the ED if his   symptoms change or worsen. If his pain persists, pt may need further evaluation. Pt is agreeable to plan and all questions have been answered at this time.      1. Allergic reaction to contrast dye, initial encounter      Re-Evaluations/Consultations:             ED Course as of 05/10/25 0857   Fri May 09, 2025   1035 Patient received IV contrast at approximately 10:09 AM.  Patient then began experiencing nasal congestion, scratchy throat, erythematous pruritic rash to the neck and chest.  Contrast was stopped with MRI halted at that time. [JH]   1049 Patient presents from outpatient MRI where he was supposed to get a cardiac MRI.  About 5 minutes after receiving gadolinium IV contrast he developed hives on his neck and upper chest, nasal congestion.  No shortness of breath, dysphonia, dysphagia, abdominal pain, nausea, vomiting.  Plan to treat for allergic reaction and reassess.  Will disposition accordingly based on results [SS]   1126 On reevaluation patient is resting comfortably in no acute distress.  He is asymptomatic.  He is requesting discharge home.  He will follow-up with cardiology due to find out if they want him to get any further MRI testing.  I did explain to him that I think it would be okay for him to receive gadolinium contrast in the future as long as

## 2025-05-09 NOTE — PROGRESS NOTES
Mri of the cardiac partially completed. Pt had a reaction to the contrast. An RRT was called and the patient was transported to the ED. All his belonging were placed in a bag and transported up with the patient.

## 2025-05-09 NOTE — PROGRESS NOTES
Called to breast care MRI for possible patient reaction to contrast.  Arrived at pt side at 1011. Pt supine on MRI table, awake, alert, oriented.  Face and upper chest flushed red.  Conjunctiva red, eyes watering.  Tongue and throat appear swollen, throat bright red.  Pt states throat feels \"weird.\"  Denies other throat/tongue/lip abnormal sensations.  Denies difficulty breathing but reports feeling \"congested.\"  Raised red marks noted over entire chest and upper abdomen.  Pt states these marks are \"from shaving\" and present prior to procedure.  No monitor or VS equipment immediately available, but peripheral pulse strong, regular, 80.  Respiratory rate 18.  Pt removed from MRI zone 4 and RRT called.  Pt transferred to transport stretcher and assisted to high fowlers position. Pt began scratching neck, reporting itching. RRT team arrived to bedside.  Pt had LifeVest with his belongings, removed prior to entering MRI zone 4.  Assisted pt to replace LifeVest, inserted battery, and verified LifeVest powered on.  Vital signs monitor arrived at bedside.  VSS: 89-/85-96% RA.  Pt transferred to ED with RRT team at 1018.  Contrast reaction box open and available at pt's bedside throughout episode, no treatment given prior to transfer to ED.

## 2025-05-10 NOTE — ED PROVIDER NOTES
ATTENDING PROVIDER ATTESTATION:     Solomon Adams presented to the emergency department for evaluation of allergic reaction and was initially evaluated by the resident physician. See Original ED Note for H&P and ED course above.     I have reviewed and discussed the case, including pertinent history (medical, surgical, family and social) and exam findings with the resident.  I have personally performed and/or participated in the history, exam, medical decision making, and procedures and agree with all pertinent clinical information except for any differences as noted below. I was present for all key portions of any procedures performed during the ED course. I personally reviewed any EKG obtained and agree with the resident interpretation unless stated otherwise below.   I have reviewed my findings and recommendations with the assigned resident, the patient, and members of family present at the time of disposition.      ED Course as of 05/09/25 2200   Fri May 09, 2025   1035 Patient received IV contrast at approximately 10:09 AM.  Patient then began experiencing nasal congestion, scratchy throat, erythematous pruritic rash to the neck and chest.  Contrast was stopped with MRI halted at that time. [JH]   1049 Patient presents from outpatient MRI where he was supposed to get a cardiac MRI.  About 5 minutes after receiving gadolinium IV contrast he developed hives on his neck and upper chest, nasal congestion.  No shortness of breath, dysphonia, dysphagia, abdominal pain, nausea, vomiting.  Plan to treat for allergic reaction and reassess.  Will disposition accordingly based on results [SS]   1126 On reevaluation patient is resting comfortably in no acute distress.  He is asymptomatic.  He is requesting discharge home.  He will follow-up with cardiology due to find out if they want him to get any further MRI testing.  I did explain to him that I think it would be okay for him to receive gadolinium contrast in the future as

## 2025-05-12 ENCOUNTER — TELEPHONE (OUTPATIENT)
Dept: OTHER | Age: 45
End: 2025-05-12

## 2025-05-12 NOTE — TELEPHONE ENCOUNTER
2:03 PM  Spoke with patient re: medication adjustment per Nehal SARABIA-CNp.     I have reviewed the provider's instructions with the patient, answering all questions to his satisfaction.    Labs reviewed   Decrease lasix to PRN as discussed   Start Jardiance 10 mg daily   Follow up in CHF clinic as scheduled     Electronically signed by Sharron Dotson RN on 5/12/2025 at 2:03 PM

## 2025-05-15 ENCOUNTER — RESULTS FOLLOW-UP (OUTPATIENT)
Age: 45
End: 2025-05-15

## 2025-05-15 ENCOUNTER — HOSPITAL ENCOUNTER (OUTPATIENT)
Dept: OTHER | Age: 45
Setting detail: THERAPIES SERIES
Discharge: HOME | End: 2025-05-15
Payer: COMMERCIAL

## 2025-05-15 VITALS
BODY MASS INDEX: 38.65 KG/M2 | OXYGEN SATURATION: 95 % | SYSTOLIC BLOOD PRESSURE: 90 MMHG | WEIGHT: 285 LBS | RESPIRATION RATE: 16 BRPM | HEART RATE: 63 BPM | DIASTOLIC BLOOD PRESSURE: 60 MMHG

## 2025-05-15 DIAGNOSIS — I50.20 HFREF (HEART FAILURE WITH REDUCED EJECTION FRACTION) (HCC): Primary | ICD-10-CM

## 2025-05-15 LAB
ANION GAP SERPL CALCULATED.3IONS-SCNC: 9 MMOL/L (ref 7–16)
BNP SERPL-MCNC: 181 PG/ML (ref 0–125)
BUN SERPL-MCNC: 21 MG/DL (ref 6–20)
CALCIUM SERPL-MCNC: 9.3 MG/DL (ref 8.6–10.2)
CHLORIDE SERPL-SCNC: 105 MMOL/L (ref 98–107)
CO2 SERPL-SCNC: 23 MMOL/L (ref 22–29)
CREAT SERPL-MCNC: 1.4 MG/DL (ref 0.7–1.2)
GFR, ESTIMATED: 62 ML/MIN/1.73M2
GLUCOSE SERPL-MCNC: 89 MG/DL (ref 74–99)
POTASSIUM SERPL-SCNC: 5.4 MMOL/L (ref 3.5–5)
SODIUM SERPL-SCNC: 137 MMOL/L (ref 132–146)

## 2025-05-15 PROCEDURE — 80048 BASIC METABOLIC PNL TOTAL CA: CPT

## 2025-05-15 PROCEDURE — 36415 COLL VENOUS BLD VENIPUNCTURE: CPT

## 2025-05-15 PROCEDURE — 99205 OFFICE O/P NEW HI 60 MIN: CPT

## 2025-05-15 PROCEDURE — 83880 ASSAY OF NATRIURETIC PEPTIDE: CPT

## 2025-05-15 NOTE — PROGRESS NOTES
Congestive Heart Failure Clinic   [facility]      Referring Provider: Dr Shelley  Primary Care Physician: No primary care provider on file.   Cardiologist: Dr Duffy  Nephrologist: N/A      HISTORY OF PRESENT ILLNESS:     Solomon Adams is a 44 y.o. (1980) male with a history of HFrEF (EF < 40%)  Pre Cupid:     Lab Results   Component Value Date    LVEF 25 04/26/2025     Post Cupid:  No results found for: \"EFBP\"      He presents to the CHF clinic for ongoing evaluation and monitoring of heart failure.    In the CHF clinic today he denies any adverse symptoms except:  [] Dizziness or lightheadedness   [] Syncope or near syncope  [] Recent Fall  [] Shortness of breath at rest   [] Dyspnea with exertion  [] Decline in functional capacity (unable to perform activities they had previously been able to do)  [] Fatigue   [] Orthopnea  [] PND  [] Nocturnal cough  [] Hemoptysis  [] Chest pain, pressure, or discomfort  [] Palpitations  [] Abdominal distention  [] Abdominal bloating  [] Early satiety  [] Blood in stool   [] Diarrhea  [] Constipation  [] Nausea/Vomiting  [] Decreased urinary response to oral diuretic   [] Scrotal swelling   [] Lower extremity edema  [] Used PRN doses of oral diuretic   [] Weight gain    Wt Readings from Last 3 Encounters:   05/15/25 129.3 kg (285 lb)   05/09/25 129.3 kg (285 lb)   05/08/25 130.2 kg (287 lb)           SOCIAL HISTORY:  [x] Denies tobacco, alcohol or illicit drug abuse  [] Tobacco use:  [] ETOH use:  [] Illicit drug use:        MEDICATIONS:    Allergies   Allergen Reactions    Gadolinium Anaphylaxis    Iodinated Contrast Media Anaphylaxis, Shortness Of Breath and Itching     Prior to Visit Medications    Medication Sig Taking? Authorizing Provider   furosemide (LASIX) 20 MG tablet Take 1 tablet by mouth daily as needed (weight gain, shortness of breath, swelling) Hold if systolic blood pressure is less than 100 Yes

## 2025-05-16 NOTE — TELEPHONE ENCOUNTER
Patient informed that they will read what they got from the report within 2-3 weeks. We will follow up once we get the results.

## 2025-05-23 ENCOUNTER — HOSPITAL ENCOUNTER (OUTPATIENT)
Age: 45
Discharge: HOME | End: 2025-05-23
Payer: COMMERCIAL

## 2025-05-23 ENCOUNTER — TELEPHONE (OUTPATIENT)
Age: 45
End: 2025-05-23

## 2025-05-23 ENCOUNTER — RESULTS FOLLOW-UP (OUTPATIENT)
Age: 45
End: 2025-05-23

## 2025-05-23 DIAGNOSIS — Z92.241 HISTORY OF ANABOLIC STEROID USE: ICD-10-CM

## 2025-05-23 DIAGNOSIS — I50.20 HFREF (HEART FAILURE WITH REDUCED EJECTION FRACTION) (HCC): ICD-10-CM

## 2025-05-23 LAB
ANION GAP SERPL CALCULATED.3IONS-SCNC: 11 MMOL/L (ref 7–16)
BASOPHILS # BLD: 0.08 K/UL (ref 0–0.2)
BASOPHILS NFR BLD: 1 % (ref 0–2)
BUN SERPL-MCNC: 27 MG/DL (ref 6–20)
CALCIUM SERPL-MCNC: 8.9 MG/DL (ref 8.6–10.2)
CHLORIDE SERPL-SCNC: 108 MMOL/L (ref 98–107)
CO2 SERPL-SCNC: 21 MMOL/L (ref 22–29)
CREAT SERPL-MCNC: 1.6 MG/DL (ref 0.7–1.2)
EOSINOPHIL # BLD: 0.3 K/UL (ref 0.05–0.5)
EOSINOPHILS RELATIVE PERCENT: 4 % (ref 0–6)
ERYTHROCYTE [DISTWIDTH] IN BLOOD BY AUTOMATED COUNT: 15.3 % (ref 11.5–15)
GFR, ESTIMATED: 53 ML/MIN/1.73M2
GLUCOSE SERPL-MCNC: 89 MG/DL (ref 74–99)
HCT VFR BLD AUTO: 56.9 % (ref 37–54)
HGB BLD-MCNC: 18.3 G/DL (ref 12.5–16.5)
IMM GRANULOCYTES # BLD AUTO: 0.03 K/UL (ref 0–0.58)
IMM GRANULOCYTES NFR BLD: 0 % (ref 0–5)
LYMPHOCYTES NFR BLD: 2.2 K/UL (ref 1.5–4)
LYMPHOCYTES RELATIVE PERCENT: 26 % (ref 20–42)
MCH RBC QN AUTO: 29 PG (ref 26–35)
MCHC RBC AUTO-ENTMCNC: 32.2 G/DL (ref 32–34.5)
MCV RBC AUTO: 90.2 FL (ref 80–99.9)
MONOCYTES NFR BLD: 0.63 K/UL (ref 0.1–0.95)
MONOCYTES NFR BLD: 8 % (ref 2–12)
NEUTROPHILS NFR BLD: 61 % (ref 43–80)
NEUTS SEG NFR BLD: 5.09 K/UL (ref 1.8–7.3)
PLATELET # BLD AUTO: 214 K/UL (ref 130–450)
PMV BLD AUTO: 9.9 FL (ref 7–12)
POTASSIUM SERPL-SCNC: 4.2 MMOL/L (ref 3.5–5)
RBC # BLD AUTO: 6.31 M/UL (ref 3.8–5.8)
SODIUM SERPL-SCNC: 140 MMOL/L (ref 132–146)
WBC OTHER # BLD: 8.3 K/UL (ref 4.5–11.5)

## 2025-05-23 PROCEDURE — 80048 BASIC METABOLIC PNL TOTAL CA: CPT

## 2025-05-23 PROCEDURE — 36415 COLL VENOUS BLD VENIPUNCTURE: CPT

## 2025-05-23 PROCEDURE — 85025 COMPLETE CBC W/AUTO DIFF WBC: CPT

## 2025-05-23 NOTE — TELEPHONE ENCOUNTER
Nehal Persaud, ELSIE - Paulina Mejias MA  Labs reviewed  Potassium improved  Remind to stay hydrated  Follow up in CHF clinic as scheduled      Informed patient.     Patient asked about the ability to play pickleball. Per Nehal ZIMMERMAN - CNP, patient is fine to play the game while wearing his Lifevest. Avoid strenuous activity or lifting.

## 2025-05-23 NOTE — RESULT ENCOUNTER NOTE
Labs reviewed  Potassium improved   Remind to stay hydrated  Follow up in CHF clinic as scheduled

## 2025-05-29 ENCOUNTER — PATIENT MESSAGE (OUTPATIENT)
Age: 45
End: 2025-05-29

## 2025-05-29 NOTE — TELEPHONE ENCOUNTER
Called patient and informed patient of everything that was stated by Nehal. Patient verbalized understanding and was thankful.

## 2025-06-04 ENCOUNTER — PATIENT MESSAGE (OUTPATIENT)
Age: 45
End: 2025-06-04

## 2025-06-04 RX ORDER — SACUBITRIL AND VALSARTAN 24; 26 MG/1; MG/1
1 TABLET, FILM COATED ORAL 2 TIMES DAILY
Qty: 60 TABLET | Refills: 5 | OUTPATIENT
Start: 2025-06-04

## 2025-06-05 ENCOUNTER — HOSPITAL ENCOUNTER (OUTPATIENT)
Dept: OTHER | Age: 45
Setting detail: THERAPIES SERIES
Discharge: HOME | End: 2025-06-05
Payer: COMMERCIAL

## 2025-06-05 VITALS
WEIGHT: 288 LBS | HEART RATE: 56 BPM | OXYGEN SATURATION: 98 % | RESPIRATION RATE: 16 BRPM | DIASTOLIC BLOOD PRESSURE: 69 MMHG | SYSTOLIC BLOOD PRESSURE: 115 MMHG | BODY MASS INDEX: 39.06 KG/M2

## 2025-06-05 LAB
ANION GAP SERPL CALCULATED.3IONS-SCNC: 11 MMOL/L (ref 7–16)
BNP SERPL-MCNC: 56 PG/ML (ref 0–125)
BUN SERPL-MCNC: 28 MG/DL (ref 6–20)
CALCIUM SERPL-MCNC: 9.2 MG/DL (ref 8.6–10.2)
CHLORIDE SERPL-SCNC: 109 MMOL/L (ref 98–107)
CO2 SERPL-SCNC: 20 MMOL/L (ref 22–29)
CREAT SERPL-MCNC: 1.3 MG/DL (ref 0.7–1.2)
GFR, ESTIMATED: 72 ML/MIN/1.73M2
GLUCOSE SERPL-MCNC: 100 MG/DL (ref 74–99)
POTASSIUM SERPL-SCNC: 4.5 MMOL/L (ref 3.5–5)
SODIUM SERPL-SCNC: 140 MMOL/L (ref 132–146)

## 2025-06-05 PROCEDURE — 80048 BASIC METABOLIC PNL TOTAL CA: CPT

## 2025-06-05 PROCEDURE — 83880 ASSAY OF NATRIURETIC PEPTIDE: CPT

## 2025-06-05 PROCEDURE — 99214 OFFICE O/P EST MOD 30 MIN: CPT

## 2025-06-05 PROCEDURE — 36415 COLL VENOUS BLD VENIPUNCTURE: CPT

## 2025-06-05 ASSESSMENT — PATIENT HEALTH QUESTIONNAIRE - PHQ9
SUM OF ALL RESPONSES TO PHQ QUESTIONS 1-9: 2
1. LITTLE INTEREST OR PLEASURE IN DOING THINGS: SEVERAL DAYS
SUM OF ALL RESPONSES TO PHQ QUESTIONS 1-9: 2
SUM OF ALL RESPONSES TO PHQ QUESTIONS 1-9: 2
2. FEELING DOWN, DEPRESSED OR HOPELESS: SEVERAL DAYS
SUM OF ALL RESPONSES TO PHQ QUESTIONS 1-9: 2

## 2025-06-05 NOTE — PROGRESS NOTES
Congestive Heart Failure Clinic   [facility]      Referring Provider: Dr Shelley  Primary Care Physician: No primary care provider on file.   Cardiologist: Dr Duffy  Nephrologist: N/A      HISTORY OF PRESENT ILLNESS:     Solomon Adams is a 44 y.o. (1980) male with a history of HFrEF (EF < 40%)  Pre Cupid:     Lab Results   Component Value Date    LVEF 25 04/26/2025     Post Cupid:  No results found for: \"EFBP\"      He presents to the CHF clinic for ongoing evaluation and monitoring of heart failure.    In the CHF clinic today he denies any adverse symptoms except:  [] Dizziness or lightheadedness   [] Syncope or near syncope  [] Recent Fall  [] Shortness of breath at rest   [] Dyspnea with exertion  [] Decline in functional capacity (unable to perform activities they had previously been able to do)  [] Fatigue   [] Orthopnea  [] PND  [] Nocturnal cough  [] Hemoptysis  [] Chest pain, pressure, or discomfort  [] Palpitations  [] Abdominal distention  [] Abdominal bloating  [] Early satiety  [] Blood in stool   [] Diarrhea  [] Constipation  [] Nausea/Vomiting  [] Decreased urinary response to oral diuretic   [] Scrotal swelling   [] Lower extremity edema  [] Used PRN doses of oral diuretic   [x] Weight gain patient is up 3 lbs but home wts. Are stable    Wt Readings from Last 3 Encounters:   06/05/25 130.6 kg (288 lb)   05/15/25 129.3 kg (285 lb)   05/09/25 129.3 kg (285 lb)           SOCIAL HISTORY:  [x] Denies tobacco, alcohol or illicit drug abuse  [] Tobacco use:  [] ETOH use:  [] Illicit drug use:        MEDICATIONS:    Allergies   Allergen Reactions    Gadolinium Anaphylaxis    Iodinated Contrast Media Anaphylaxis, Shortness Of Breath and Itching     Prior to Visit Medications    Medication Sig Taking? Authorizing Provider   sacubitril-valsartan (ENTRESTO) 24-26 MG per tablet Take 1 tablet by mouth 2 times daily Hold if systolic blood pressure is less

## 2025-06-10 ENCOUNTER — PATIENT MESSAGE (OUTPATIENT)
Age: 45
End: 2025-06-10

## 2025-06-12 ENCOUNTER — OFFICE VISIT (OUTPATIENT)
Dept: CARDIOLOGY CLINIC | Age: 45
End: 2025-06-12
Payer: COMMERCIAL

## 2025-06-12 VITALS
HEIGHT: 71 IN | WEIGHT: 291.4 LBS | OXYGEN SATURATION: 97 % | DIASTOLIC BLOOD PRESSURE: 72 MMHG | BODY MASS INDEX: 40.8 KG/M2 | HEART RATE: 59 BPM | RESPIRATION RATE: 18 BRPM | SYSTOLIC BLOOD PRESSURE: 116 MMHG | TEMPERATURE: 97.7 F

## 2025-06-12 DIAGNOSIS — I42.8 NICM (NONISCHEMIC CARDIOMYOPATHY) (HCC): ICD-10-CM

## 2025-06-12 DIAGNOSIS — E66.01 MORBID OBESITY (HCC): ICD-10-CM

## 2025-06-12 DIAGNOSIS — R06.02 SHORTNESS OF BREATH: ICD-10-CM

## 2025-06-12 DIAGNOSIS — I50.20 HFREF (HEART FAILURE WITH REDUCED EJECTION FRACTION) (HCC): Primary | ICD-10-CM

## 2025-06-12 PROCEDURE — 99214 OFFICE O/P EST MOD 30 MIN: CPT | Performed by: INTERNAL MEDICINE

## 2025-06-12 PROCEDURE — G2211 COMPLEX E/M VISIT ADD ON: HCPCS | Performed by: INTERNAL MEDICINE

## 2025-06-12 PROCEDURE — 93000 ELECTROCARDIOGRAM COMPLETE: CPT | Performed by: INTERNAL MEDICINE

## 2025-06-12 NOTE — PATIENT INSTRUCTIONS
Continue all your medications at current doses.   Check limited echo.   Restrict sodium intake to less than 2-2.5 g/day. Restrict fluid intake to less than 2.2 L/day. Goal BP is less than 130/80.   If your weight increases by > 2 lbs in a day or >5 lbs in more than a day, take an extra lasix pill.   Please try to exercise for 150 minutes a week.   I will see you back in the office in 6 months. Please call the office at [phone] for Donna) if you have any questions.

## 2025-06-12 NOTE — PROGRESS NOTES
to iodine       PLAN:  Overall, he appears stable and mildly hypervolemic.  Terms of GDMT, he is on Entresto 24-26 twice daily, Toprol-XL 50 twice daily, Jardiance 10, Aldactone 25.  Remains on LifeVest.  No LifeVest alarms  Schedule limited echocardiogram next month to reassess biventricular function.  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  Unfortunately, gadolinium imaging could not be performed on account of an allergic reaction.  Will find out if premedication can be successful especially in the event of persistent systolic dysfunction.  No significant family history of cardiac disease except for atrial fibrillation in the father.  Should above be negative, may consider genetic evaluation as well  Based on results, will determine candidacy for defibrillator therapy  Okay to go back to work with gradual escalation of activities  Remains in sinus rhythm.  Continue Eliquis for anticoagulation  Check hemoglobin A1c to assess candidacy for GLP-1 agonists.  Counseled about dietary lifestyle modifications    Follow-up in 3-month

## 2025-06-16 ENCOUNTER — TELEPHONE (OUTPATIENT)
Dept: CARDIOLOGY | Age: 45
End: 2025-06-16

## 2025-06-17 ENCOUNTER — HOSPITAL ENCOUNTER (OUTPATIENT)
Dept: CARDIOLOGY | Age: 45
Discharge: HOME | End: 2025-06-19
Payer: COMMERCIAL

## 2025-06-17 VITALS
HEIGHT: 71 IN | SYSTOLIC BLOOD PRESSURE: 116 MMHG | DIASTOLIC BLOOD PRESSURE: 72 MMHG | WEIGHT: 291 LBS | BODY MASS INDEX: 40.74 KG/M2

## 2025-06-17 DIAGNOSIS — R06.02 SHORTNESS OF BREATH: ICD-10-CM

## 2025-06-17 PROCEDURE — 93325 DOPPLER ECHO COLOR FLOW MAPG: CPT

## 2025-06-18 ENCOUNTER — RESULTS FOLLOW-UP (OUTPATIENT)
Dept: CARDIOLOGY CLINIC | Age: 45
End: 2025-06-18

## 2025-06-18 LAB
ECHO BSA: 2.57 M2
ECHO LA VOL A-L A2C: 45 ML (ref 18–58)
ECHO LA VOL A-L A4C: 57 ML (ref 18–58)
ECHO LA VOL MOD A2C: 43 ML (ref 18–58)
ECHO LA VOL MOD A4C: 54 ML (ref 18–58)
ECHO LA VOLUME AREA LENGTH: 52 ML
ECHO LA VOLUME INDEX A-L A2C: 18 ML/M2 (ref 16–34)
ECHO LA VOLUME INDEX A-L A4C: 23 ML/M2 (ref 16–34)
ECHO LA VOLUME INDEX AREA LENGTH: 21 ML/M2 (ref 16–34)
ECHO LA VOLUME INDEX MOD A2C: 17 ML/M2 (ref 16–34)
ECHO LA VOLUME INDEX MOD A4C: 22 ML/M2 (ref 16–34)
ECHO LV EDV A2C: 101 ML
ECHO LV EDV A4C: 125 ML
ECHO LV EDV BP: 113 ML (ref 67–155)
ECHO LV EDV INDEX A4C: 51 ML/M2
ECHO LV EDV INDEX BP: 46 ML/M2
ECHO LV EDV NDEX A2C: 41 ML/M2
ECHO LV EF PHYSICIAN: 45 %
ECHO LV EJECTION FRACTION A2C: 42 %
ECHO LV EJECTION FRACTION A4C: 54 %
ECHO LV EJECTION FRACTION BIPLANE: 48 % (ref 55–100)
ECHO LV ESV A2C: 59 ML
ECHO LV ESV A4C: 58 ML
ECHO LV ESV BP: 59 ML (ref 22–58)
ECHO LV ESV INDEX A2C: 24 ML/M2
ECHO LV ESV INDEX A4C: 23 ML/M2
ECHO LV ESV INDEX BP: 24 ML/M2
ECHO LV FRACTIONAL SHORTENING: 29 % (ref 28–44)
ECHO LV INTERNAL DIMENSION DIASTOLE INDEX: 2.27 CM/M2
ECHO LV INTERNAL DIMENSION DIASTOLIC: 5.6 CM (ref 4.2–5.9)
ECHO LV INTERNAL DIMENSION SYSTOLIC INDEX: 1.62 CM/M2
ECHO LV INTERNAL DIMENSION SYSTOLIC: 4 CM
ECHO LV IVSD: 1.4 CM (ref 0.6–1)
ECHO LV MASS 2D: 313.2 G (ref 88–224)
ECHO LV MASS INDEX 2D: 126.8 G/M2 (ref 49–115)
ECHO LV POSTERIOR WALL DIASTOLIC: 1.2 CM (ref 0.6–1)
ECHO LV RELATIVE WALL THICKNESS RATIO: 0.43
ECHO MV A VELOCITY: 0.59 M/S
ECHO MV E DECELERATION TIME (DT): 181.3 MS
ECHO MV E VELOCITY: 0.48 M/S
ECHO MV E/A RATIO: 0.81
ECHO MV MAX VELOCITY: 0.5 M/S
ECHO MV MEAN GRADIENT: 0 MMHG
ECHO MV MEAN VELOCITY: 0.2 M/S
ECHO MV PEAK GRADIENT: 1 MMHG
ECHO MV VTI: 16.7 CM

## 2025-06-18 PROCEDURE — 93308 TTE F-UP OR LMTD: CPT | Performed by: INTERNAL MEDICINE

## 2025-06-18 PROCEDURE — 93321 DOPPLER ECHO F-UP/LMTD STD: CPT | Performed by: INTERNAL MEDICINE

## 2025-06-18 NOTE — TELEPHONE ENCOUNTER
Patient called in notified of echo results and ok per Dr. Duffy to D/C lifevest. Patient states he has an allergy to both dye and gadolinium allergy and is concerned on having MRI at this time.

## 2025-06-26 ENCOUNTER — TELEPHONE (OUTPATIENT)
Dept: CARDIOLOGY CLINIC | Age: 45
End: 2025-06-26

## 2025-06-26 RX ORDER — PREDNISONE 50 MG/1
50 TABLET ORAL SEE ADMIN INSTRUCTIONS
COMMUNITY
End: 2025-06-26 | Stop reason: SDUPTHER

## 2025-06-26 RX ORDER — DIPHENHYDRAMINE HCL 50 MG
50 CAPSULE ORAL SEE ADMIN INSTRUCTIONS
Qty: 1 CAPSULE | Refills: 0 | Status: SHIPPED | OUTPATIENT
Start: 2025-06-26

## 2025-06-26 RX ORDER — PREDNISONE 50 MG/1
50 TABLET ORAL SEE ADMIN INSTRUCTIONS
Qty: 3 TABLET | Refills: 0 | Status: SHIPPED | OUTPATIENT
Start: 2025-06-26

## 2025-06-26 RX ORDER — DIPHENHYDRAMINE HCL 50 MG
50 CAPSULE ORAL SEE ADMIN INSTRUCTIONS
COMMUNITY
End: 2025-06-26 | Stop reason: SDUPTHER

## 2025-06-26 NOTE — TELEPHONE ENCOUNTER
My Note Signed 8:59 AM     Addend     Delete     Copy     Patient needs prior auth Cardiac MRI for cardiomyopathy for not scheduled pending prior auth.Thanks      CPT 18589  ICD I42.9

## 2025-06-26 NOTE — TELEPHONE ENCOUNTER
Patient needs prior auth Cardiac MRI for cardiomyopathy for not scheduled pending prior auth.Thanks      CPT 59973  ICD I42.9

## 2025-07-03 ENCOUNTER — TELEPHONE (OUTPATIENT)
Dept: CARDIOLOGY CLINIC | Age: 45
End: 2025-07-03

## 2025-07-03 DIAGNOSIS — I42.0 DILATED CARDIOMYOPATHY (HCC): ICD-10-CM

## 2025-07-03 DIAGNOSIS — I42.9 CARDIOMYOPATHY, UNSPECIFIED TYPE (HCC): Primary | ICD-10-CM

## 2025-07-03 RX ORDER — REGADENOSON 0.08 MG/ML
0.4 INJECTION, SOLUTION INTRAVENOUS
OUTPATIENT
Start: 2025-07-03

## 2025-07-03 NOTE — TELEPHONE ENCOUNTER
Patient notified and order placed for stress test and number given to pt for stress dept [phone].

## 2025-07-07 DIAGNOSIS — I42.9 CARDIOMYOPATHY, UNSPECIFIED TYPE (HCC): Primary | ICD-10-CM

## 2025-07-08 ENCOUNTER — TELEPHONE (OUTPATIENT)
Dept: OTHER | Age: 45
End: 2025-07-08

## 2025-07-22 ENCOUNTER — TELEPHONE (OUTPATIENT)
Dept: CARDIOLOGY | Age: 45
End: 2025-07-22

## 2025-07-22 RX ORDER — METOPROLOL SUCCINATE 50 MG/1
50 TABLET, EXTENDED RELEASE ORAL 2 TIMES DAILY
Qty: 60 TABLET | Refills: 3 | Status: SHIPPED | OUTPATIENT
Start: 2025-07-22

## 2025-07-22 NOTE — TELEPHONE ENCOUNTER
Left message on voice mail to remind patient of nuclear stress test appointment on July 24, 2025 at 0730. Instructions for test,current medication list, hold jardiance morning of test, hold metoprolol 24 hours before test, and COVID-19 preprocedure information left on voice mail. Asked patient to call with any questions or if unable to keep appointment.

## 2025-07-24 ENCOUNTER — HOSPITAL ENCOUNTER (OUTPATIENT)
Dept: CARDIOLOGY | Age: 45
Discharge: HOME | End: 2025-07-26
Payer: COMMERCIAL

## 2025-07-24 VITALS
DIASTOLIC BLOOD PRESSURE: 70 MMHG | HEART RATE: 56 BPM | RESPIRATION RATE: 12 BRPM | BODY MASS INDEX: 40.74 KG/M2 | SYSTOLIC BLOOD PRESSURE: 104 MMHG | HEIGHT: 71 IN | WEIGHT: 291 LBS

## 2025-07-24 DIAGNOSIS — I42.9 CARDIOMYOPATHY, UNSPECIFIED TYPE (HCC): ICD-10-CM

## 2025-07-24 LAB
ECHO BSA: 2.57 M2
NUC STRESS EJECTION FRACTION: 51 %
STRESS BASELINE DIAS BP: 70 MMHG
STRESS BASELINE HR: 57 BPM
STRESS BASELINE SYS BP: 104 MMHG
STRESS ESTIMATED WORKLOAD: 12.8 METS
STRESS EXERCISE DUR MIN: 9 MIN
STRESS EXERCISE DUR SEC: 51 SEC
STRESS PEAK DIAS BP: 84 MMHG
STRESS PEAK SYS BP: 140 MMHG
STRESS PERCENT HR ACHIEVED: 86 %
STRESS POST PEAK HR: 150 BPM
STRESS RATE PRESSURE PRODUCT: NORMAL BPM*MMHG
STRESS TARGET HR: 175 BPM

## 2025-07-24 PROCEDURE — 3430000000 HC RX DIAGNOSTIC RADIOPHARMACEUTICAL: Performed by: INTERNAL MEDICINE

## 2025-07-24 PROCEDURE — 93017 CV STRESS TEST TRACING ONLY: CPT

## 2025-07-24 PROCEDURE — 78452 HT MUSCLE IMAGE SPECT MULT: CPT | Performed by: INTERNAL MEDICINE

## 2025-07-24 PROCEDURE — 93018 CV STRESS TEST I&R ONLY: CPT | Performed by: INTERNAL MEDICINE

## 2025-07-24 PROCEDURE — 2500000003 HC RX 250 WO HCPCS: Performed by: INTERNAL MEDICINE

## 2025-07-24 PROCEDURE — A9500 TC99M SESTAMIBI: HCPCS | Performed by: INTERNAL MEDICINE

## 2025-07-24 PROCEDURE — 93016 CV STRESS TEST SUPVJ ONLY: CPT | Performed by: INTERNAL MEDICINE

## 2025-07-24 RX ORDER — TETRAKIS(2-METHOXYISOBUTYLISOCYANIDE)COPPER(I) TETRAFLUOROBORATE 1 MG/ML
34 INJECTION, POWDER, LYOPHILIZED, FOR SOLUTION INTRAVENOUS
Status: COMPLETED | OUTPATIENT
Start: 2025-07-24 | End: 2025-07-24

## 2025-07-24 RX ORDER — SODIUM CHLORIDE 0.9 % (FLUSH) 0.9 %
10 SYRINGE (ML) INJECTION PRN
Status: DISCONTINUED | OUTPATIENT
Start: 2025-07-24 | End: 2025-07-27 | Stop reason: HOSPADM

## 2025-07-24 RX ORDER — TETRAKIS(2-METHOXYISOBUTYLISOCYANIDE)COPPER(I) TETRAFLUOROBORATE 1 MG/ML
10.7 INJECTION, POWDER, LYOPHILIZED, FOR SOLUTION INTRAVENOUS
Status: COMPLETED | OUTPATIENT
Start: 2025-07-24 | End: 2025-07-24

## 2025-07-24 RX ADMIN — SODIUM CHLORIDE, PRESERVATIVE FREE 10 ML: 5 INJECTION INTRAVENOUS at 09:03

## 2025-07-24 RX ADMIN — Medication 34 MILLICURIE: at 09:03

## 2025-07-24 RX ADMIN — Medication 10.7 MILLICURIE: at 07:24

## 2025-07-24 RX ADMIN — SODIUM CHLORIDE, PRESERVATIVE FREE 10 ML: 5 INJECTION INTRAVENOUS at 07:24

## 2025-07-25 ENCOUNTER — RESULTS FOLLOW-UP (OUTPATIENT)
Dept: CARDIOLOGY CLINIC | Age: 45
End: 2025-07-25

## 2025-07-25 NOTE — TELEPHONE ENCOUNTER
----- Message from Dr. Brett Duffy MD sent at 7/24/2025  5:42 PM EDT -----  Normal stress results.

## 2025-08-22 RX ORDER — AMIODARONE HYDROCHLORIDE 200 MG/1
TABLET ORAL
Qty: 30 TABLET | Refills: 1 | Status: SHIPPED | OUTPATIENT
Start: 2025-08-22

## 2025-08-26 ENCOUNTER — OFFICE VISIT (OUTPATIENT)
Dept: NON INVASIVE DIAGNOSTICS | Age: 45
End: 2025-08-26
Payer: COMMERCIAL

## 2025-08-26 VITALS
SYSTOLIC BLOOD PRESSURE: 118 MMHG | WEIGHT: 313.2 LBS | RESPIRATION RATE: 18 BRPM | DIASTOLIC BLOOD PRESSURE: 78 MMHG | HEIGHT: 71 IN | BODY MASS INDEX: 43.85 KG/M2 | HEART RATE: 63 BPM

## 2025-08-26 DIAGNOSIS — I48.91 ATRIAL FIBRILLATION WITH RVR (HCC): Primary | ICD-10-CM

## 2025-08-26 PROCEDURE — 93000 ELECTROCARDIOGRAM COMPLETE: CPT | Performed by: INTERNAL MEDICINE

## 2025-08-26 PROCEDURE — 99214 OFFICE O/P EST MOD 30 MIN: CPT | Performed by: INTERNAL MEDICINE

## 2025-08-27 ENCOUNTER — TELEPHONE (OUTPATIENT)
Dept: OTHER | Age: 45
End: 2025-08-27

## 2025-09-05 ENCOUNTER — HOSPITAL ENCOUNTER (OUTPATIENT)
Dept: MRI IMAGING | Age: 45
Discharge: HOME | End: 2025-09-05
Attending: INTERNAL MEDICINE
Payer: COMMERCIAL

## 2025-09-05 ENCOUNTER — HOSPITAL ENCOUNTER (EMERGENCY)
Age: 45
Discharge: HOME | End: 2025-09-05
Attending: EMERGENCY MEDICINE
Payer: COMMERCIAL

## 2025-09-05 VITALS
DIASTOLIC BLOOD PRESSURE: 91 MMHG | TEMPERATURE: 98.8 F | RESPIRATION RATE: 28 BRPM | OXYGEN SATURATION: 92 % | SYSTOLIC BLOOD PRESSURE: 142 MMHG | HEART RATE: 82 BPM

## 2025-09-05 DIAGNOSIS — T50.8X5A ALLERGIC REACTION TO CONTRAST MATERIAL, INITIAL ENCOUNTER: Primary | ICD-10-CM

## 2025-09-05 DIAGNOSIS — I50.33 ACUTE ON CHRONIC HEART FAILURE WITH PRESERVED EJECTION FRACTION (HCC): ICD-10-CM

## 2025-09-05 DIAGNOSIS — I50.41 ACUTE COMBINED SYSTOLIC AND DIASTOLIC CONGESTIVE HEART FAILURE (HCC): ICD-10-CM

## 2025-09-05 DIAGNOSIS — I50.20 HFREF (HEART FAILURE WITH REDUCED EJECTION FRACTION) (HCC): ICD-10-CM

## 2025-09-05 PROCEDURE — 75561 CARDIAC MRI FOR MORPH W/DYE: CPT

## 2025-09-05 PROCEDURE — 6360000004 HC RX CONTRAST MEDICATION: Performed by: INTERNAL MEDICINE

## 2025-09-05 PROCEDURE — 6360000002 HC RX W HCPCS

## 2025-09-05 PROCEDURE — A9579 GAD-BASE MR CONTRAST NOS,1ML: HCPCS | Performed by: INTERNAL MEDICINE

## 2025-09-05 RX ORDER — GADOTERIDOL 279.3 MG/ML
30 INJECTION INTRAVENOUS
Status: COMPLETED | OUTPATIENT
Start: 2025-09-05 | End: 2025-09-05

## 2025-09-05 RX ORDER — METHYLPREDNISOLONE SODIUM SUCCINATE 125 MG/2ML
INJECTION INTRAMUSCULAR; INTRAVENOUS
Status: DISCONTINUED
Start: 2025-09-05 | End: 2025-09-05

## 2025-09-05 RX ORDER — METHYLPREDNISOLONE SODIUM SUCCINATE 125 MG/2ML
125 INJECTION INTRAMUSCULAR; INTRAVENOUS ONCE
Status: COMPLETED | OUTPATIENT
Start: 2025-09-05 | End: 2025-09-05

## 2025-09-05 RX ORDER — FAMOTIDINE 10 MG/ML
INJECTION, SOLUTION INTRAVENOUS
Status: DISCONTINUED
Start: 2025-09-05 | End: 2025-09-05

## 2025-09-05 RX ORDER — FAMOTIDINE 10 MG/ML
20 INJECTION, SOLUTION INTRAVENOUS ONCE
Status: COMPLETED | OUTPATIENT
Start: 2025-09-05 | End: 2025-09-05

## 2025-09-05 RX ORDER — DIPHENHYDRAMINE HYDROCHLORIDE 50 MG/ML
INJECTION, SOLUTION INTRAMUSCULAR; INTRAVENOUS
Status: DISCONTINUED
Start: 2025-09-05 | End: 2025-09-05

## 2025-09-05 RX ORDER — DIPHENHYDRAMINE HYDROCHLORIDE 50 MG/ML
50 INJECTION, SOLUTION INTRAMUSCULAR; INTRAVENOUS ONCE
Status: COMPLETED | OUTPATIENT
Start: 2025-09-05 | End: 2025-09-05

## 2025-09-05 RX ADMIN — DIPHENHYDRAMINE HYDROCHLORIDE 50 MG: 50 INJECTION, SOLUTION INTRAMUSCULAR; INTRAVENOUS at 12:35

## 2025-09-05 RX ADMIN — FAMOTIDINE 20 MG: 10 INJECTION, SOLUTION INTRAVENOUS at 12:34

## 2025-09-05 RX ADMIN — DIPHENHYDRAMINE HYDROCHLORIDE 50 MG: 50 INJECTION INTRAMUSCULAR; INTRAVENOUS at 12:35

## 2025-09-05 RX ADMIN — GADOTERIDOL 30 ML: 279.3 INJECTION, SOLUTION INTRAVENOUS at 13:04

## 2025-09-05 RX ADMIN — METHYLPREDNISOLONE SODIUM SUCCINATE 125 MG: 125 INJECTION INTRAMUSCULAR; INTRAVENOUS at 12:35

## 2025-09-05 ASSESSMENT — PAIN - FUNCTIONAL ASSESSMENT: PAIN_FUNCTIONAL_ASSESSMENT: 0-10

## (undated) DEVICE — GUIDEWIRE VASC J 0.035 INX300 CM INTERMED SHAPEABLE TIP WHLY

## (undated) DEVICE — CATHETER DIAG 5FR L100CM LUMN ID0.047IN JL3.5 CRV 0 SIDE H

## (undated) DEVICE — KIT ANGIO W/ AT P65 PREM HND CTRL FOR CNTRST DEL ANGIOTOUCH

## (undated) DEVICE — DEVICE COMPR LNG 27 CM VASC BND

## (undated) DEVICE — Device

## (undated) DEVICE — KIT MFLD ISOLATN NACL CNTRST PRT TBNG SPIK W/ PRSS TRNSDUC

## (undated) DEVICE — ANGIOGRAPHIC CATHETER: Brand: EXPO™

## (undated) DEVICE — CANNULA NSL CANN NSL L25FT TBNG AD O2 SUP SFT UC

## (undated) DEVICE — CATHETER DIAG 5FR L100CM LUMN ID0.047IN JR4 CRV 0 SIDE H

## (undated) DEVICE — PAD, DEFIB, ADULT, RADIOTRAN, PHYSIO, LO: Brand: MEDLINE

## (undated) DEVICE — GLIDESHEATH SLENDER ACCESS KIT: Brand: GLIDESHEATH SLENDER